# Patient Record
Sex: FEMALE | Race: WHITE | NOT HISPANIC OR LATINO | Employment: FULL TIME | URBAN - METROPOLITAN AREA
[De-identification: names, ages, dates, MRNs, and addresses within clinical notes are randomized per-mention and may not be internally consistent; named-entity substitution may affect disease eponyms.]

---

## 2017-04-03 DIAGNOSIS — E78.00 HIGH CHOLESTEROL: ICD-10-CM

## 2017-04-03 RX ORDER — ATORVASTATIN CALCIUM 10 MG/1
10 TABLET, FILM COATED ORAL DAILY
Qty: 90 TABLET | Refills: 3 | OUTPATIENT
Start: 2017-04-03

## 2017-04-03 NOTE — TELEPHONE ENCOUNTER
RN unable to refill med as last lipids were 1.5 years ago.  Routing to provider to advise.  Maria L Reyes RN

## 2017-04-07 DIAGNOSIS — E78.00 HIGH CHOLESTEROL: ICD-10-CM

## 2017-04-07 RX ORDER — ATORVASTATIN CALCIUM 10 MG/1
10 TABLET, FILM COATED ORAL DAILY
Qty: 30 TABLET | Refills: 0 | Status: SHIPPED | OUTPATIENT
Start: 2017-04-07 | End: 2017-05-02

## 2017-04-07 NOTE — TELEPHONE ENCOUNTER
Pt due for fasting lab appointment and ov for cholesterol and chronic rhinitis for further refills.  Teri Murphy RN

## 2017-04-14 ENCOUNTER — DOCUMENTATION ONLY (OUTPATIENT)
Dept: LAB | Facility: CLINIC | Age: 57
End: 2017-04-14

## 2017-04-14 DIAGNOSIS — Z11.59 NEED FOR HEPATITIS C SCREENING TEST: ICD-10-CM

## 2017-04-14 DIAGNOSIS — Z13.6 CARDIOVASCULAR SCREENING; LDL GOAL LESS THAN 160: Primary | ICD-10-CM

## 2017-04-18 ENCOUNTER — TELEPHONE (OUTPATIENT)
Dept: FAMILY MEDICINE | Facility: CLINIC | Age: 57
End: 2017-04-18

## 2017-04-18 DIAGNOSIS — Z12.11 SPECIAL SCREENING FOR MALIGNANT NEOPLASMS, COLON: Primary | ICD-10-CM

## 2017-04-18 NOTE — TELEPHONE ENCOUNTER
Panel Management Review      Patient has the following on her problem list:       IVD   ASA: Passed    Last LDL:    Lab Results   Component Value Date    CHOL 169 12/07/2015     Lab Results   Component Value Date    HDL 48 12/07/2015     Lab Results   Component Value Date    LDL 86 12/07/2015     Lab Results   Component Value Date    TRIG 173 12/07/2015        Lab Results   Component Value Date    CHOLHDLRATIO 4.7 06/14/2014        Is the patient on a Statin? YES   Is the patient on Aspirin? YES                  Medications     HMG CoA Reductase Inhibitors    atorvastatin (LIPITOR) 10 MG tablet    Salicylates    aspirin 81 MG tablet          Last three blood pressure readings:  BP Readings from Last 3 Encounters:   09/06/16 126/88   08/04/16 136/86   12/15/15 130/72        Tobacco History:     History   Smoking Status     Former Smoker     Packs/day: 1.00     Years: 35.00     Types: Cigarettes     Quit date: 6/23/2014   Smokeless Tobacco     Never Used         Composite cancer screening  Chart review shows that this patient is due/due soon for the following Colonoscopy and Fecal Colorectal (FIT)  Summary:    Patient is due/failing the following:   FIT    Action needed:   Fit test needs to be mailed to patient     Type of outreach:    None, routed to provider for review.    Questions for provider review:    Please sign FIT order and close encounter                                                                                                                                     Hesham Santiago CMA       Chart routed to Provider .

## 2017-04-19 DIAGNOSIS — Z11.59 NEED FOR HEPATITIS C SCREENING TEST: ICD-10-CM

## 2017-04-19 DIAGNOSIS — Z13.6 CARDIOVASCULAR SCREENING; LDL GOAL LESS THAN 160: ICD-10-CM

## 2017-04-19 LAB
CHOLEST SERPL-MCNC: 174 MG/DL
HCV AB SERPL QL IA: NORMAL
HDLC SERPL-MCNC: 53 MG/DL
LDLC SERPL CALC-MCNC: 79 MG/DL
NONHDLC SERPL-MCNC: 121 MG/DL
TRIGL SERPL-MCNC: 211 MG/DL

## 2017-04-19 PROCEDURE — 80061 LIPID PANEL: CPT | Performed by: FAMILY MEDICINE

## 2017-04-19 PROCEDURE — 86803 HEPATITIS C AB TEST: CPT | Performed by: FAMILY MEDICINE

## 2017-04-19 PROCEDURE — 36415 COLL VENOUS BLD VENIPUNCTURE: CPT | Performed by: FAMILY MEDICINE

## 2017-05-01 NOTE — PROGRESS NOTES
SUBJECTIVE:                                                    Kirstin Simmons is a 56 year old female who presents to clinic today for the following health issues:       History of Present Illness   Hyperlipidemia:     Low fat/chol diet rating::  Not monitoring fat    Taking Statins::  YES    Lipid Medications or Supplements::  None  Frequency of exercise::  None  Taking medications regularly::  Yes  Additional concerns today::  No    SUBJECTIVE:  56 year old enters for recheck of high cholesterol.  Pt. Has been taking med and has no side effects. Pt is following diet.  Denies chest pain and SOB.  Past Medical History:   Diagnosis Date     Abdominal or pelvic swelling, mass or lump, unspecified site      Abnormal glandular Papanicolaou smear of cervix      Abnormal Pap smear 09/06/2006     Backache, unspecified      Insomnia, unspecified      Other specified trigeminal nerve disorders      Thoracic or lumbosacral neuritis or radiculitis, unspecified      Unspecified disorder of menstruation and other abnormal bleeding from female genital tract      Unspecified symptom associated with female genital organs      Viral warts, unspecified      Past Surgical History:   Procedure Laterality Date     ABDOMEN SURGERY      Gall bladder removed. Fallopian tube removed     C OOPHORECTORMY FOR MALIG, W/BX  09/29/2006    left ovary and fallopian tube, left salpingo-oophorectomy     CHOLECYSTECTOMY, LAPOROSCOPIC      Cholecystectomy, Laparoscopic       Current Outpatient Prescriptions:      atorvastatin (LIPITOR) 10 MG tablet, Take 1 tablet (10 mg) by mouth daily, Disp: 90 tablet, Rfl: 3     aspirin 81 MG tablet, Take 1 tablet (81 mg) by mouth daily, Disp: 90 tablet, Rfl: 3     cetirizine (ZYRTEC) 10 MG tablet, Take 1 tablet by mouth daily., Disp: 90 tablet, Rfl: 3     [DISCONTINUED] atorvastatin (LIPITOR) 10 MG tablet, Take 1 tablet (10 mg) by mouth daily, Disp: 30 tablet, Rfl: 0  Reviewed health maintenance   Patient Active  Problem List   Diagnosis     Abnormal Pap smear     CARDIOVASCULAR SCREENING; LDL GOAL LESS THAN 160     Menopause     S/P oophorectomy     Lumbar radiculopathy     Chronic rhinitis     Skin tag     Benign neoplasm of skin of trunk, except scrotum     Backache     Dry eye syndrome     High cholesterol     Skin lesion     Advanced directives, counseling/discussion       OBJECTIVE:  no apparent distress  /76  Pulse 82  Temp 98.3  F (36.8  C) (Oral)  Wt 205 lb 6.4 oz (93.2 kg)  SpO2 100%  BMI 35.26 kg/m2      Exam:  Constitutional: healthy, alert and no distress  Head: Normocephalic. No masses, lesions, tenderness or abnormalities  Neck: Neck supple. No adenopathy. Thyroid symmetric, normal size,  Cardiovascular: negative, PMI normal. No lifts, heaves, or thrills. RRR. No murmurs, clicks gallops or rub  Respiratory: negative Lungs clear    Orders Only on 04/19/2017   Component Date Value Ref Range Status     Hepatitis C Antibody 04/19/2017   NR Final                    Value:Nonreactive   Assay performance characteristics have not been established for newborns,   infants, and children       Cholesterol 04/19/2017 174  <200 mg/dL Final     Triglycerides 04/19/2017 211* <150 mg/dL Final    Comment: Borderline high:  150-199 mg/dl   High:             200-499 mg/dl   Very high:       >499 mg/dl   Fasting specimen       HDL Cholesterol 04/19/2017 53  >49 mg/dL Final     LDL Cholesterol Calculated 04/19/2017 79  <100 mg/dL Final    Desirable:       <100 mg/dl     Non HDL Cholesterol 04/19/2017 121  <130 mg/dL Final           ICD-10-CM    1. High cholesterol E78.00 atorvastatin (LIPITOR) 10 MG tablet    PLAN: Follow up in 1 year

## 2017-05-02 ENCOUNTER — OFFICE VISIT (OUTPATIENT)
Dept: FAMILY MEDICINE | Facility: CLINIC | Age: 57
End: 2017-05-02
Payer: COMMERCIAL

## 2017-05-02 VITALS
WEIGHT: 205.4 LBS | BODY MASS INDEX: 35.26 KG/M2 | OXYGEN SATURATION: 100 % | TEMPERATURE: 98.3 F | HEART RATE: 82 BPM | DIASTOLIC BLOOD PRESSURE: 76 MMHG | SYSTOLIC BLOOD PRESSURE: 114 MMHG

## 2017-05-02 DIAGNOSIS — E78.00 HIGH CHOLESTEROL: ICD-10-CM

## 2017-05-02 PROCEDURE — 99213 OFFICE O/P EST LOW 20 MIN: CPT | Performed by: FAMILY MEDICINE

## 2017-05-02 RX ORDER — ATORVASTATIN CALCIUM 10 MG/1
10 TABLET, FILM COATED ORAL DAILY
Qty: 90 TABLET | Refills: 3 | Status: SHIPPED | OUTPATIENT
Start: 2017-05-02 | End: 2018-09-27

## 2017-05-02 NOTE — NURSING NOTE
"Chief Complaint   Patient presents with     Lipids       Initial /76  Pulse 82  Temp 98.3  F (36.8  C) (Oral)  Wt 205 lb 6.4 oz (93.2 kg)  SpO2 100%  BMI 35.26 kg/m2 Estimated body mass index is 35.26 kg/(m^2) as calculated from the following:    Height as of 8/4/16: 5' 4\" (1.626 m).    Weight as of this encounter: 205 lb 6.4 oz (93.2 kg).  Medication Reconciliation: complete  Hesham Santiago CMA    "

## 2017-05-02 NOTE — MR AVS SNAPSHOT
After Visit Summary   5/2/2017    Kirstin Simmons    MRN: [id number]           Patient Information     Date Of Birth          1960        Visit Information        Provider Department      5/2/2017 8:00 AM Rigo Cerda MD [facility]        Today's Diagnoses     High cholesterol           Follow-ups after your visit        Who to contact     If you have questions or need follow up information about today's clinic visit or your schedule please contact [facility] directly at [phone].  Normal or non-critical lab and imaging results will be communicated to you by MyChart, letter or phone within 4 business days after the clinic has received the results. If you do not hear from us within 7 days, please contact the clinic through [employer] or phone. If you have a critical or abnormal lab result, we will notify you by phone as soon as possible.  Submit refill requests through [employer] or call your pharmacy and they will forward the refill request to us. Please allow 3 business days for your refill to be completed.          Additional Information About Your Visit        MyChart Information     [employer] gives you secure access to your electronic health record. If you see a primary care provider, you can also send messages to your care team and make appointments. If you have questions, please call your primary care clinic.  If you do not have a primary care provider, please call [phone] and they will assist you.        Care EveryWhere ID     This is your Care EveryWhere ID. This could be used by other organizations to access your [place] medical records  [id number]        Your Vitals Were     Pulse Temperature Pulse Oximetry BMI (Body Mass Index)          82 98.3  F (36.8  C) (Oral) 100% 35.26 kg/m2         Blood Pressure from Last 3 Encounters:   05/02/17 114/76   09/06/16 126/88   08/04/16 136/86    Weight from Last 3 Encounters:   05/02/17 205 lb 6.4 oz (93.2 kg)    09/14/16 204 lb (92.5 kg)   09/06/16 204 lb 3.2 oz (92.6 kg)              Today, you had the following     No orders found for display         Today's Medication Changes          These changes are accurate as of: 5/2/17  8:38 AM.  If you have any questions, ask your nurse or doctor.               Stop taking these medicines if you haven't already. Please contact your care team if you have questions.     fluticasone 50 MCG/ACT spray   Commonly known as:  FLONASE   Stopped by:  Rigo Cerda MD                Where to get your medicines      These medications were sent to [facility] [address] [facility]  [address] [facility] [address]     Phone:  [phone]     atorvastatin 10 MG tablet                Primary Care Provider Office Phone # Fax #    Rigo Cerda [phone]       [facility] [address] [address] [address]        Thank you!     Thank you for choosing [facility] [place]  for your care. Our goal is always to provide you with excellent care. Hearing back from our patients is one way we can continue to improve our services. Please take a few minutes to complete the written survey that you may receive in the mail after your visit with us. Thank you!             Your Updated Medication List - Protect others around you: Learn how to safely use, store and throw away your medicines at www.disposemymeds.org.          This list is accurate as of: 5/2/17  8:38 AM.  Always use your most recent med list.                   Brand Name Dispense Instructions for use    aspirin 81 MG tablet     90 tablet    Take 1 tablet (81 mg) by mouth daily       atorvastatin 10 MG tablet    LIPITOR    90 tablet    Take 1 tablet (10 mg) by mouth daily       cetirizine 10 MG tablet    zyrTEC    90 tablet    Take 1 tablet by mouth daily.

## 2017-05-03 ENCOUNTER — MYC MEDICAL ADVICE (OUTPATIENT)
Dept: FAMILY MEDICINE | Facility: CLINIC | Age: 57
End: 2017-05-03

## 2017-05-03 NOTE — TELEPHONE ENCOUNTER
:;   Per Dr. Rigo Creda:  Schedule this person tomorrow (Thursday) at 2pm on his schedule.  I sent [employer] message back to grandma with instructions to call and register baby and leave message for team to schedule.  (see message below) Mellisa Cho RN

## 2017-05-11 ENCOUNTER — MYC MEDICAL ADVICE (OUTPATIENT)
Dept: FAMILY MEDICINE | Facility: CLINIC | Age: 57
End: 2017-05-11

## 2017-05-11 NOTE — TELEPHONE ENCOUNTER
Patient to have insurance cover should probably be seen by me or sports medicine  before MRI scan is done

## 2017-06-22 ENCOUNTER — OFFICE VISIT (OUTPATIENT)
Dept: FAMILY MEDICINE | Facility: CLINIC | Age: 57
End: 2017-06-22
Payer: COMMERCIAL

## 2017-06-22 VITALS
TEMPERATURE: 98.2 F | WEIGHT: 205 LBS | RESPIRATION RATE: 15 BRPM | BODY MASS INDEX: 35.19 KG/M2 | DIASTOLIC BLOOD PRESSURE: 75 MMHG | OXYGEN SATURATION: 98 % | HEART RATE: 89 BPM | SYSTOLIC BLOOD PRESSURE: 133 MMHG

## 2017-06-22 DIAGNOSIS — J31.0 CHRONIC RHINITIS: ICD-10-CM

## 2017-06-22 DIAGNOSIS — H57.89 EYE IRRITATION: Primary | ICD-10-CM

## 2017-06-22 PROCEDURE — 99213 OFFICE O/P EST LOW 20 MIN: CPT | Performed by: FAMILY MEDICINE

## 2017-06-22 RX ORDER — DIPHENHYDRAMINE HCL 25 MG
1 CAPSULE ORAL 3 TIMES DAILY PRN
Qty: 1 BOTTLE | Refills: 0 | Status: SHIPPED | OUTPATIENT
Start: 2017-06-22 | End: 2018-06-05

## 2017-06-22 NOTE — NURSING NOTE
"Chief Complaint   Patient presents with     Eye Problem     c/o pain in left eye       Initial /75  Pulse 89  Temp 98.2  F (36.8  C) (Tympanic)  Resp 15  Wt 205 lb (93 kg)  SpO2 98%  Breastfeeding? No  BMI 35.19 kg/m2 Estimated body mass index is 35.19 kg/(m^2) as calculated from the following:    Height as of 8/4/16: 5' 4\" (1.626 m).    Weight as of this encounter: 205 lb (93 kg).  Medication Reconciliation: complete   Gemma Ebuanks MA      "

## 2017-06-22 NOTE — MR AVS SNAPSHOT
After Visit Summary   6/22/2017    Kirstin Simmons    MRN: [id number]           Patient Information     Date Of Birth          1960        Visit Information        Provider Department      6/22/2017 2:40 PM Jenny Bond MD [facility]        Today's Diagnoses     Eye irritation    -  1    Chronic rhinitis           Follow-ups after your visit        Additional Services     ALLERGY/ASTHMA ADULT REFERRAL       Your provider has referred you to: G: [facility]  [phone] [email or web address]    Please be aware that coverage of these services is subject to the terms and limitations of your health insurance plan.  Call member services at your health plan with any benefit or coverage questions.      Please bring the following with you to your appointment:    (1) Any X-Rays, CTs or MRIs which have been performed.  Contact the facility where they were done to arrange for  prior to your scheduled appointment.    (2) List of current medications  (3) This referral request   (4) Any documents/labs given to you for this referral                  Who to contact     If you have questions or need follow up information about today's clinic visit or your schedule please contact [facility] directly at [phone].  Normal or non-critical lab and imaging results will be communicated to you by MyChart, letter or phone within 4 business days after the clinic has received the results. If you do not hear from us within 7 days, please contact the clinic through MyChart or phone. If you have a critical or abnormal lab result, we will notify you by phone as soon as possible.  Submit refill requests through [employer] or call your pharmacy and they will forward the refill request to us. Please allow 3 business days for your refill to be completed.          Additional Information About Your Visit        MyChart Information     MyChart  gives you secure access to your electronic health record. If you see a primary care provider, you can also send messages to your care team and make appointments. If you have questions, please call your primary care clinic.  If you do not have a primary care provider, please call [phone] and they will assist you.        Care EveryWhere ID     This is your Care EveryWhere ID. This could be used by other organizations to access your [facility] medical records  [id number]        Your Vitals Were     Pulse Temperature Respirations Pulse Oximetry Breastfeeding? BMI (Body Mass Index)    89 98.2  F (36.8  C) (Tympanic) 15 98% No 35.19 kg/m2       Blood Pressure from Last 3 Encounters:   06/22/17 133/75   05/02/17 114/76   09/06/16 126/88    Weight from Last 3 Encounters:   06/22/17 205 lb (93 kg)   05/02/17 205 lb 6.4 oz (93.2 kg)   09/14/16 204 lb (92.5 kg)              We Performed the Following     ALLERGY/ASTHMA ADULT REFERRAL          Today's Medication Changes          These changes are accurate as of: 6/22/17 11:06 PM.  If you have any questions, ask your nurse or doctor.               Start taking these medicines.        Dose/Directions    hypromellose-dextran 0.3-0.1% opthalmic solution   Used for:  Eye irritation   Started by:  Jenny Bond MD        Dose:  1 drop   Place 1 drop into both eyes 3 times daily as needed   Quantity:  1 Bottle   Refills:  0            Where to get your medicines      These medications were sent to [facility] - [place], [place] - [address] [facility]  [address] [facility] [address]     Phone:  [phone]     hypromellose-dextran 0.3-0.1% opthalmic solution                Primary Care Provider Office Phone # Fax #    Rigo Cerda [phone]       [facility] [address] [facility] [address]        Equal Access to Services     BLAKE CASAS AH: Jevon Castillo, sukumar rodríguez, qatylor de souza  manju delucasteven jasminisidoro arrietaaan ah. Monse [facility] [phone].    ATENCIÓN: Si ivyla dawson, tiene a zuleta disposición servicios gratuitos de asistencia lingüística. Godwin al [phone].    We comply with applicable federal civil rights laws and [place] laws. We do not discriminate on the basis of race, color, national origin, age, disability sex, sexual orientation or gender identity.            Thank you!     [facility] you for [facility]. Our goal is always to provide you with excellent care. Hearing back from our patients is one way we can continue to improve our services. Please take a few minutes to complete the written survey that you may receive in the mail after your visit with us. Thank you!             Your Updated Medication List - Protect others around you: Learn how to safely use, store and throw away your medicines at www.disposemymeds.org.          This list is accurate as of: 6/22/17 11:06 PM.  Always use your most recent med list.                   Brand Name Dispense Instructions for use Diagnosis    aspirin 81 MG tablet     90 tablet    Take 1 tablet (81 mg) by mouth daily        atorvastatin 10 MG tablet    LIPITOR    90 tablet    Take 1 tablet (10 mg) by mouth daily    High cholesterol       cetirizine 10 MG tablet    zyrTEC    90 tablet    Take 1 tablet by mouth daily.    Acute maxillary sinusitis       hypromellose-dextran 0.3-0.1% opthalmic solution     1 Bottle    Place 1 drop into both eyes 3 times daily as needed    Eye irritation

## 2017-06-22 NOTE — PROGRESS NOTES
SUBJECTIVE:                                                    Kirstin Simmons is a 56 year old female who presents to clinic today for the following health issues:      ALLERGIES      Duration: 1 day    Description:   Nasal congestion: YES  Sneezing: YES  Red, itchy eyes: YES    Accompanying signs and symptoms: roof of mouth sore    History (similar episodes/allergy testing): None    Precipitating or alleviating factors: None    Therapies tried and outcome: Zyrtec     Last year had the same symptoms ended up with a case of severe case of pink eye.  Was going back and forth in clinic to help it get better.  She took some patanol and made it worse.   Finally got a prescription and eventually got better    When this came on this morning thought it might be similar to pink eye.    Cough occasional  Fever No  Progressively getting worse: no  Thought was getting better then started getting worse: YES  Getting better:  No  Exposure to pertussis or pertussis like symptoms: No    Problem list and histories reviewed & adjusted, as indicated.  Additional history: as documented    Problem list, Medication list, Allergies, and Medical/Social/Surgical histories reviewed in EPIC and updated as appropriate.    ROS:  Constitutional, HEENT, cardiovascular, pulmonary, gi and gu systems are negative, except as otherwise noted.    OBJECTIVE:                                                    /75  Pulse 89  Temp 98.2  F (36.8  C) (Tympanic)  Resp 15  Wt 205 lb (93 kg)  SpO2 98%  Breastfeeding? No  BMI 35.19 kg/m2  Body mass index is 35.19 kg/(m^2).  GENERAL: healthy, alert and no distress  Eyes:  No grossly visible conjunctival or corneal foreign body No hyphema, no hypopyon, no ciliary flush, no periorbital swelling or cellulitis or pain with extraocular muscle movement. Very Mild erythema hardly noticeable bilateral conjunctiva  HENT: ear canals and TM's normal, nose and mouth without ulcers or lesions  Sinuses: turbinates  slightly pale  NECK: no adenopathy, no asymmetry, masses, or scars and thyroid normal to palpation  RESP: lungs clear to auscultation - no rales, rhonchi or wheezes   CV: regular rate and rhythm, normal S1 S2, no S3 or S4, no murmur, click or rub, no peripheral edema and peripheral pulses strong  MS: no gross musculoskeletal defects noted, no edema  SKIN: no suspicious lesions or rashes  NEURO: Normal strength and tone, mentation intact and speech normal  PSYCH: mentation appears normal, affect normal/bright    Diagnostic Test Results:  No results found for this or any previous visit (from the past 24 hour(s)).     ASSESSMENT/PLAN:                                                        ICD-10-CM    1. Eye irritation H57.8 hypromellose-dextran 0.3-0.1% (ARTIFICIAL TEARS) opthalmic solution   2. Chronic rhinitis J31.0 ALLERGY/ASTHMA ADULT REFERRAL     Eye irritation doesn't seem infectious at this time and appears mild.  Could be from allergies. Zyrtec seems helping.  Patient had adverse side effect with patanol.  Trial of artificial tears to see if some dry eyes may be contributing  Patient also has history of severe seasonal allergies and would like an allergy referral, referred to allergy  Recommend follow up with primary care provider or eye department if no relief in 3 days, sooner if worse  Adverse reactions of medications discussed.  Over the counter medications discussed.   Aware to come back in if with worsening symptoms or if no relief despite treatment plan  Patient voiced understanding and had no further questions.     MD Jenny Xavier MD  [facility]

## 2017-07-20 ENCOUNTER — OFFICE VISIT (OUTPATIENT)
Dept: FAMILY MEDICINE | Facility: CLINIC | Age: 57
End: 2017-07-20
Payer: COMMERCIAL

## 2017-07-20 VITALS
BODY MASS INDEX: 35.36 KG/M2 | DIASTOLIC BLOOD PRESSURE: 85 MMHG | TEMPERATURE: 98 F | WEIGHT: 206 LBS | OXYGEN SATURATION: 97 % | HEART RATE: 84 BPM | SYSTOLIC BLOOD PRESSURE: 135 MMHG

## 2017-07-20 DIAGNOSIS — Z12.4 SCREENING FOR MALIGNANT NEOPLASM OF CERVIX: Primary | ICD-10-CM

## 2017-07-20 DIAGNOSIS — N64.4 BREAST PAIN: ICD-10-CM

## 2017-07-20 PROCEDURE — 87624 HPV HI-RISK TYP POOLED RSLT: CPT | Performed by: FAMILY MEDICINE

## 2017-07-20 PROCEDURE — 99213 OFFICE O/P EST LOW 20 MIN: CPT | Performed by: FAMILY MEDICINE

## 2017-07-20 PROCEDURE — G0145 SCR C/V CYTO,THINLAYER,RESCR: HCPCS | Performed by: FAMILY MEDICINE

## 2017-07-20 NOTE — PROGRESS NOTES
SUBJECTIVE:  56 year old.The patient has a history of breast pain left side.  This started 4 days ago. Patient was reaching for something  She developed pain over a nipple. Associated symptoms are tingling the first day.  .  Better with time. ROS no galactorrhea  swelling or a mass      Reviewed health maintenance  Patient Active Problem List   Diagnosis     Abnormal Pap smear     CARDIOVASCULAR SCREENING; LDL GOAL LESS THAN 160     Menopause     S/P oophorectomy     Lumbar radiculopathy     Chronic rhinitis     Skin tag     Benign neoplasm of skin of trunk, except scrotum     Backache     Dry eye syndrome     High cholesterol     Skin lesion     Advanced directives, counseling/discussion     Past Medical History:   Diagnosis Date     Abdominal or pelvic swelling, mass or lump, unspecified site      Abnormal glandular Papanicolaou smear of cervix      Abnormal Pap smear 09/06/2006     Backache, unspecified      Insomnia, unspecified      Other specified trigeminal nerve disorders      Thoracic or lumbosacral neuritis or radiculitis, unspecified      Unspecified disorder of menstruation and other abnormal bleeding from female genital tract      Unspecified symptom associated with female genital organs      Viral warts, unspecified        OBJECTIVE:  no apparent distress  /85  Pulse 84  Temp 98  F (36.7  C) (Oral)  Wt 206 lb (93.4 kg)  SpO2 97%  BMI 35.36 kg/m2  Breast exam no masses  Nipple and alveoli no masses  LUNGS:  CTA B/L, no wheezing or crackles.   Cardiovascular: negative, PMI normal. No lifts, heaves, or thrills. RRR. No murmurs, clicks gallops or rub   Gastrointestinal: Abdomen soft, non-tender. BS normal. No masses, organomegaly   pap smear preformed    ICD-10-CM    1. Screening for malignant neoplasm of cervix Z12.4 Pap imaged thin layer screen with HPV - recommended age 30 - 65 years (select HPV order below)   2. Breast pain N64.4 BREAST CENTER REFERRAL    PLAN: await breast center

## 2017-07-20 NOTE — MR AVS SNAPSHOT
After Visit Summary   7/20/2017    Kirstin Simmons    MRN: [id number]           Patient Information     Date Of Birth          1960        Visit Information        Provider Department      7/20/2017 3:45 PM Rigo Cerda MD [facility]        Today's Diagnoses     Screening for malignant neoplasm of cervix    -  1    Breast pain           Follow-ups after your visit        Additional Services     BREAST CENTER REFERRAL       Your provider has referred you to: [facility]: [facility] [place] [phone]   [email or web address]    Please be aware that coverage of these services is subject to the terms and limitations of your health insurance plan.  Call member services at your health plan with any benefit or coverage questions.      Please bring the following with you to your appointment:    (1) Any X-Rays, CTs or MRIs which have been performed.  Contact the facility where they were done to arrange for  prior to your scheduled appointment.    (2) List of current medications   (3) This referral request   (4) Any documents/labs given to you for this referral                  Who to contact     If you have questions or need follow up information about today's clinic visit or your schedule please contact [facility] directly at [phone].  Normal or non-critical lab and imaging results will be communicated to you by MyChart, letter or phone within 4 business days after the clinic has received the results. If you do not hear from us within 7 days, please contact the clinic through MyChart or phone. If you have a critical or abnormal lab result, we will notify you by phone as soon as possible.  Submit refill requests through [employer] or call your pharmacy and they will forward the refill request to us. Please allow 3 business days for your refill to be completed.          Additional Information  About Your Visit        [email or web address] Information     [employer] gives you secure access to your electronic health record. If you see a primary care provider, you can also send messages to your care team and make appointments. If you have questions, please call your primary care clinic.  If you do not have a primary care provider, please call [phone] and they will assist you.        Care EveryWhere ID     This is your Care EveryWhere ID. This could be used by other organizations to access your [place] medical records  [id number]        Your Vitals Were     Pulse Temperature Pulse Oximetry BMI (Body Mass Index)          84 98  F (36.7  C) (Oral) 97% 35.36 kg/m2         Blood Pressure from Last 3 Encounters:   07/20/17 135/85   06/22/17 133/75   05/02/17 114/76    Weight from Last 3 Encounters:   07/20/17 206 lb (93.4 kg)   06/22/17 205 lb (93 kg)   05/02/17 205 lb 6.4 oz (93.2 kg)              We Performed the Following     BREAST CENTER REFERRAL     Pap imaged thin layer screen with HPV - recommended age 30 - 65 years (select HPV order below)        Primary Care Provider Office Phone # Fax #    Rigo Cerda [phone]       [facility] [address] [facility] [address]        Equal Access to Services     BLAKE CASAS : Hadii july ku hadasho Soomaali, waaxda luqadaha, qaybta kaalmada adeegyada, manju steinbergin haychristina pimentel. So [facility] [phone].    ATENCIÓN: Si habla español, tiene a zuleta disposición servicios gratuitos de asistencia lingüística. Llame al [phone].    We comply with applicable federal civil rights laws and [place] laws. We do not discriminate on the basis of race, color, national origin, age, disability sex, sexual orientation or gender identity.            Thank you!     Thank you for choosing [facility]  for your care. Our goal is always to provide you with excellent care. Hearing back from our patients is one way we can  continue to improve our services. Please take a few minutes to complete the written survey that you may receive in the mail after your visit with us. Thank you!             Your Updated Medication List - Protect others around you: Learn how to safely use, store and throw away your medicines at www.disposemymeds.org.          This list is accurate as of: 7/20/17  4:47 PM.  Always use your most recent med list.                   Brand Name Dispense Instructions for use Diagnosis    aspirin 81 MG tablet     90 tablet    Take 1 tablet (81 mg) by mouth daily        atorvastatin 10 MG tablet    LIPITOR    90 tablet    Take 1 tablet (10 mg) by mouth daily    High cholesterol       cetirizine 10 MG tablet    zyrTEC    90 tablet    Take 1 tablet by mouth daily.    Acute maxillary sinusitis       hypromellose-dextran 0.3-0.1% opthalmic solution     1 Bottle    Place 1 drop into both eyes 3 times daily as needed    Eye irritation

## 2017-07-20 NOTE — NURSING NOTE
"Chief Complaint   Patient presents with     Breast Problem     left, pain to touch, tender, started Sunday       Initial /85  Pulse 84  Temp 98  F (36.7  C) (Oral)  Wt 206 lb (93.4 kg)  SpO2 97%  BMI 35.36 kg/m2 Estimated body mass index is 35.36 kg/(m^2) as calculated from the following:    Height as of 8/4/16: 5' 4\" (1.626 m).    Weight as of this encounter: 206 lb (93.4 kg).  Medication Reconciliation: complete   Shani Aquino CMA      "

## 2017-07-25 LAB
COPATH REPORT: NORMAL
PAP: NORMAL

## 2017-07-27 LAB
FINAL DIAGNOSIS: NORMAL
HPV HR 12 DNA CVX QL NAA+PROBE: NEGATIVE
HPV16 DNA SPEC QL NAA+PROBE: NEGATIVE
HPV18 DNA SPEC QL NAA+PROBE: NEGATIVE
SPECIMEN DESCRIPTION: NORMAL

## 2017-08-01 ENCOUNTER — PRE VISIT (OUTPATIENT)
Dept: SURGERY | Facility: CLINIC | Age: 57
End: 2017-08-01

## 2017-08-01 NOTE — TELEPHONE ENCOUNTER
PREVISIT INFORMATION                                                    Kirstin Simmons is scheduled for future visit with Dr. North on 8/3/17 at 3:00pm at the [facility].    Reason for visit: Left breast pain  Referring provider: Rigo Cerda MD  Have images been done? Yes   Location: [facility] in [place]   Date: 4/26/16, no recent imaging  Previous pathology reports? No  Have previous office records been requested? Records in [facility]  Has the patient seen Dr. North in the past? (for old records): No    REVIEW                                                      New patient packet mailed to patient: No, to be given at check in  Medication reconciliation complete: No    PLAN/FOLLOW-UP NEEDED                                                      Previsit review complete.  Patient will see provider at future scheduled appointment.     Patient Reminders Given:    Informed patient to bring an updated list of allergies, medications, pharmacy details and insurance information. Directed patient to come to the 2nd floor, check-in #4 for their appointment. Informed patient to call back if appointment needs to be cancelled or rescheduled at [phone].    Reminded patient to bring any outside records regarding this appointment or have them faxed to clinic at [phone].      Maryjo Rincon LPN

## 2017-08-03 ENCOUNTER — OFFICE VISIT (OUTPATIENT)
Dept: SURGERY | Facility: CLINIC | Age: 57
End: 2017-08-03
Attending: FAMILY MEDICINE
Payer: COMMERCIAL

## 2017-08-03 ENCOUNTER — RADIANT APPOINTMENT (OUTPATIENT)
Dept: MAMMOGRAPHY | Facility: CLINIC | Age: 57
End: 2017-08-03
Attending: SURGERY
Payer: COMMERCIAL

## 2017-08-03 VITALS
SYSTOLIC BLOOD PRESSURE: 158 MMHG | HEIGHT: 64 IN | DIASTOLIC BLOOD PRESSURE: 98 MMHG | BODY MASS INDEX: 35.42 KG/M2 | HEART RATE: 76 BPM | WEIGHT: 207.5 LBS

## 2017-08-03 DIAGNOSIS — Z80.3 FAMILY HISTORY OF BREAST CANCER: ICD-10-CM

## 2017-08-03 DIAGNOSIS — Z80.3 FAMILY HISTORY OF BREAST CANCER: Primary | ICD-10-CM

## 2017-08-03 DIAGNOSIS — N64.4 BREAST PAIN, LEFT: ICD-10-CM

## 2017-08-03 PROCEDURE — 77063 BREAST TOMOSYNTHESIS BI: CPT | Performed by: STUDENT IN AN ORGANIZED HEALTH CARE EDUCATION/TRAINING PROGRAM

## 2017-08-03 PROCEDURE — 99243 OFF/OP CNSLTJ NEW/EST LOW 30: CPT | Performed by: SURGERY

## 2017-08-03 PROCEDURE — G0202 SCR MAMMO BI INCL CAD: HCPCS | Performed by: STUDENT IN AN ORGANIZED HEALTH CARE EDUCATION/TRAINING PROGRAM

## 2017-08-03 NOTE — PROGRESS NOTES
CHIEF COMPLAINT:  Chief Complaint   Patient presents with     Consult     left breast pain       HISTORY OF PRESENT ILLNESS:  Kirstin Simmons is a 56 year old female who is seen in consultation at the request of  Dr. Rigo Cerda for evaluation of left breast pain.  Kirstin reached for something recently and noticed suddenly that there was an exquisitely tender spot in her left breast.  This tenderness was in the area of the nipple and present only with palpation.  It gradually resolved and by the time she saw Dr. Cerda 4 days later, it was resolved.  She has noted no other breast changes and denies nipple discharge.    Kirstin has never had a breast biopsy or cyst aspiration.  She did have a large ovarian cyst for which she had a left oophorectomy and removal of the left fallopian tube.  There was no malignancy.    Kirstin reached menarche at age 13.  She is  having had her first pregnancy at ae 21.  She did not breastfeed.  She entered menopause in her late 40's after having her ovary and fallopian tube removed.  Kirstin takes no hormone replacement and complains of vaginal dryness and pain with intercourse.  FH is significant for breast cancer in her mother and maternal GM who were both diagnosed around age 60.  Both had recurrences and  of their disease in their 70's.  There is no other FH for cancer.    REVIEW OF SYSTEMS:  Constitutional:  Negative for chills, fatigue, fever and weight change.  Eyes:  Negative for blurred vision, eye pain and photophobia.  ENT:  Negative for hearing problems, ENT pain, congestion, rhinorrhea, epistaxis, hoarseness and dental problems.  Cardiovascular:  Negative for chest pain, palpitations, tachycardia, orthopnea and edema.  Respiratory:  Negative for cough, dyspnea and hemoptysis.  Gastrointestinal:  Negative for abdominal pain, heartburn, constipation, diarrhea and stool changes.  Musculoskeletal:  Negative for arthralgias, back pain and myalgias.  Integumentary/Breast:  See  HPI.    Past Medical History:   Diagnosis Date     Abdominal or pelvic swelling, mass or lump, unspecified site      Abnormal glandular Papanicolaou smear of cervix      Abnormal Pap smear 09/06/2006     Backache, unspecified      Insomnia, unspecified      Other specified trigeminal nerve disorders      Thoracic or lumbosacral neuritis or radiculitis, unspecified      Unspecified disorder of menstruation and other abnormal bleeding from female genital tract      Unspecified symptom associated with female genital organs      Viral warts, unspecified        Past Surgical History:   Procedure Laterality Date     ABDOMEN SURGERY      Gall bladder removed. Fallopian tube removed     C OOPHORECTORMY FOR JALEEL, W/BX  09/29/2006    left ovary and fallopian tube, left salpingo-oophorectomy     CHOLECYSTECTOMY, LAPOROSCOPIC      Cholecystectomy, Laparoscopic       Family History   Problem Relation Age of Onset     Breast Cancer Mother      Hypertension Mother      Breast Cancer Maternal Grandmother      HEART DISEASE Maternal Grandmother      Hypertension Maternal Grandmother        Social History   Substance Use Topics     Smoking status: Former Smoker     Packs/day: 1.00     Years: 35.00     Types: Cigarettes     Quit date: 6/23/2014     Smokeless tobacco: Never Used     Alcohol use 1.5 - 2.0 oz/week      Comment: 5 x a month       Patient Active Problem List   Diagnosis     Abnormal Pap smear     CARDIOVASCULAR SCREENING; LDL GOAL LESS THAN 160     Menopause     S/P oophorectomy     Lumbar radiculopathy     Chronic rhinitis     Skin tag     Benign neoplasm of skin of trunk, except scrotum     Backache     Dry eye syndrome     High cholesterol     Skin lesion     Advanced directives, counseling/discussion     Allergies   Allergen Reactions     Penicillins Hives     Ceclor [Cefaclor] Hives     Flonase [Fluticasone] Nausea     Latex      Morphine GI Disturbance     Olopatadine Other (See Comments)     Swelling      Perfume   "    Eggs GI Disturbance     Current Outpatient Prescriptions   Medication Sig Dispense Refill     hypromellose-dextran 0.3-0.1% (ARTIFICIAL TEARS) opthalmic solution Place 1 drop into both eyes 3 times daily as needed 1 Bottle 0     atorvastatin (LIPITOR) 10 MG tablet Take 1 tablet (10 mg) by mouth daily 90 tablet 3     aspirin 81 MG tablet Take 1 tablet (81 mg) by mouth daily 90 tablet 3     cetirizine (ZYRTEC) 10 MG tablet Take 1 tablet by mouth daily. 90 tablet 3     Vitals: BP (!) 158/98 (BP Location: Left arm)  Pulse 76  Ht 1.626 m (5' 4\")  Wt 94.1 kg (207 lb 8 oz)  BMI 35.62 kg/m2  BMI= Body mass index is 35.62 kg/(m^2).    EXAM:  GENERAL: healthy, alert and no distress   BREAST:  The breasts appear symmetric with no overlying skin changes.  The nipples are normal bilaterally.  There is no dimpling or thickening of the skin.  No mass is appreciated in either breast.  The breast tissue is generally soft with ridges of dense breast tissue in the upper outer breasts. The tissue around the left nipple is a little tender to palpation, but no abnormality was identified.  There is no axillary or supraclavicular lymphadenopathy.  CARDIOVASCULAR:  No edema or JVD.  RESPIRATORY: negative findings: no chest deformities noted, no chest wall tenderness, breathing is unlabored.  NECK: Neck supple. No adenopathy.   SKIN: No suspicious lesions or rashes  LYMPH: Normal cervical lymph nodes    ASSESSMENT:  Kirstin Simmons has no concerning findings on clinical breast exam at this time.  She is a little overdue for annual screening mammograms, so she had them done today and the report is pending.  I am not sure what caused the pain in her left breast, but since it resolved spontaneously, it does not seem to be of consequence.  Kirstin and I talked about her risk based on her FH.  I did not think that she needed to have any additional screening or testing, but did stress the importance of annual clinical breast exams and annual " mammograms.  I also talked about obesity and sedentary lifestyle as risk factors for breast cancer and encouraged exercise.  Kirstin told me that she has foot and knee problems and it was unlikely that she would start exercising.    PLAN:  Kirstin had annual screening mammograms with tomosynthesis today.  If the mammograms are read as being negative, she will continue with routine annual screening and will follow up with me if the pain returns or if she notices any other concerning breast changes.    Total time with patient visit: 45 minutes including discussions about the plan of care and care coordination with the patient.    Lilo North MD    Please route or send letter to:  Primary Care Provider (PCP)

## 2017-08-03 NOTE — LETTER
August 3, 2017       RE:  Kirstin Simmons-:  [birth year]    HISTORY OF PRESENT ILLNESS:  Kirstin Simmons is a 56 year old female who is seen in consultation at the request of  Dr. Rigo Cerda for evaluation of left breast pain.  Kirstin reached for something recently and noticed suddenly that there was an exquisitely tender spot in her left breast.  This tenderness was in the area of the nipple and present only with palpation.  It gradually resolved and by the time she saw Dr. Cerda 4 days later, it was resolved.  She has noted no other breast changes and denies nipple discharge.    Kirstin has never had a breast biopsy or cyst aspiration.  She did have a large ovarian cyst for which she had a left oophorectomy and removal of the left fallopian tube.  There was no malignancy.     Kirstin reached menarche at age 13.  She is  having had her first pregnancy at ae 21.  She did not breastfeed.  She entered menopause in her late 40's after having her ovary and fallopian tube removed.  Kirstin takes no hormone replacement and complains of vaginal dryness and pain with intercourse.  FH is significant for breast cancer in her mother and maternal GM who were both diagnosed around age 60.  Both had recurrences and  of their disease in their 70's.  There is no other FH for cancer.     REVIEW OF SYSTEMS:  Constitutional:  Negative for chills, fatigue, fever and weight change.  Eyes:  Negative for blurred vision, eye pain and photophobia.  ENT:  Negative for hearing problems, ENT pain, congestion, rhinorrhea, epistaxis, hoarseness and dental problems.  Cardiovascular:  Negative for chest pain, palpitations, tachycardia, orthopnea and edema.  Respiratory:  Negative for cough, dyspnea and hemoptysis.  Gastrointestinal:  Negative for abdominal pain, heartburn, constipation, diarrhea and stool changes.  Musculoskeletal:  Negative for arthralgias, back pain and myalgias.  Integumentary/Breast:  See HPI.     Vitals: BP (!) 158/98 (BP  "Location: Left arm)  Pulse 76  Ht 1.626 m (5' 4\")  Wt 94.1 kg (207 lb 8 oz)  BMI 35.62 kg/m2  BMI= Body mass index is 35.62 kg/(m^2).     EXAM:  GENERAL: healthy, alert and no distress   BREAST:  The breasts appear symmetric with no overlying skin changes.  The nipples are normal bilaterally.  There is no dimpling or thickening of the skin.  No mass is appreciated in either breast.  The breast tissue is generally soft with ridges of dense breast tissue in the upper outer breasts. The tissue around the left nipple is a little tender to palpation, but no abnormality was identified.  There is no axillary or supraclavicular lymphadenopathy.  CARDIOVASCULAR:  No edema or JVD.  RESPIRATORY: negative findings: no chest deformities noted, no chest wall tenderness, breathing is unlabored.  NECK: Neck supple. No adenopathy.   SKIN: No suspicious lesions or rashes  LYMPH: Normal cervical lymph nodes     ASSESSMENT:  Kirstin Simmons has no concerning findings on clinical breast exam at this time.  She is a little overdue for annual screening mammograms, so she had them done today and the report is pending.  I am not sure what caused the pain in her left breast, but since it resolved spontaneously, it does not seem to be of consequence.  Kirstin and I talked about her risk based on her FH.  I did not think that she needed to have any additional screening or testing, but did stress the importance of annual clinical breast exams and annual mammograms.  I also talked about obesity and sedentary lifestyle as risk factors for breast cancer and encouraged exercise.  Kirstin told me that she has foot and knee problems and it was unlikely that she would start exercising.     PLAN:  Kirstin had annual screening mammograms with tomosynthesis today.  If the mammograms are read as being negative, she will continue with routine annual screening and will follow up with me if the pain returns or if she notices any other concerning breast " changes.    Lilo North MD

## 2017-08-03 NOTE — NURSING NOTE
"Kirstin Simmons's goals for this visit include: left breast pain  She requests these members of her care team be copied on today's visit information: no    PCP: Rigo Cerda    Referring Provider:  Rigo Cerda MD  [facility]  [address] [place] [address]    Chief Complaint   Patient presents with     Consult     breast pain       Initial There were no vitals taken for this visit. Estimated body mass index is 35.36 kg/(m^2) as calculated from the following:    Height as of 8/4/16: 1.626 m (5' 4\").    Weight as of 7/20/17: 93.4 kg (206 lb).  Medication Reconciliation: complete    Do you need any medication refills at today's visit? No    Maryjo Rincon LPN      "

## 2017-08-22 ENCOUNTER — OFFICE VISIT (OUTPATIENT)
Dept: FAMILY MEDICINE | Facility: CLINIC | Age: 57
End: 2017-08-22
Payer: COMMERCIAL

## 2017-08-22 ENCOUNTER — RADIANT APPOINTMENT (OUTPATIENT)
Dept: GENERAL RADIOLOGY | Facility: CLINIC | Age: 57
End: 2017-08-22
Attending: FAMILY MEDICINE
Payer: COMMERCIAL

## 2017-08-22 VITALS
OXYGEN SATURATION: 100 % | DIASTOLIC BLOOD PRESSURE: 80 MMHG | WEIGHT: 206.4 LBS | SYSTOLIC BLOOD PRESSURE: 138 MMHG | HEART RATE: 78 BPM | BODY MASS INDEX: 35.43 KG/M2 | TEMPERATURE: 97.1 F

## 2017-08-22 DIAGNOSIS — S83.502A SPRAIN OF CRUCIATE LIGAMENT OF LEFT KNEE, INITIAL ENCOUNTER: ICD-10-CM

## 2017-08-22 DIAGNOSIS — Z12.11 SPECIAL SCREENING FOR MALIGNANT NEOPLASMS, COLON: ICD-10-CM

## 2017-08-22 DIAGNOSIS — N95.2 ATROPHIC VAGINITIS: Primary | ICD-10-CM

## 2017-08-22 PROCEDURE — 99214 OFFICE O/P EST MOD 30 MIN: CPT | Performed by: FAMILY MEDICINE

## 2017-08-22 PROCEDURE — 73560 X-RAY EXAM OF KNEE 1 OR 2: CPT | Mod: LT

## 2017-08-22 NOTE — MR AVS SNAPSHOT
After Visit Summary   8/22/2017    Kirstin Simmons    MRN: [id number]           Patient Information     Date Of Birth          1960        Visit Information        Provider Department      8/22/2017 8:30 AM Rigo Cerda MD [facility]        Today's Diagnoses     Atrophic vaginitis    -  1    Sprain of cruciate ligament of left knee, initial encounter        Special screening for malignant neoplasms, colon           Follow-ups after your visit        Future tests that were ordered for you today     Open Future Orders        Priority Expected Expires Ordered    Fecal colorectal cancer screen FIT - Future (S+30) Routine 9/12/2017 9/21/2017 8/22/2017            Who to contact     If you have questions or need follow up information about today's clinic visit or your schedule please contact [facility] directly at [phone].  Normal or non-critical lab and imaging results will be communicated to you by MyChart, letter or phone within 4 business days after the clinic has received the results. If you do not hear from us within 7 days, please contact the clinic through [employer] or phone. If you have a critical or abnormal lab result, we will notify you by phone as soon as possible.  Submit refill requests through [employer] or call your pharmacy and they will forward the refill request to us. Please allow 3 business days for your refill to be completed.          Additional Information About Your Visit        [employer] Information     [employer] gives you secure access to your electronic health record. If you see a primary care provider, you can also send messages to your care team and make appointments. If you have questions, please call your primary care clinic.  If you do not have a primary care provider, please call [phone] and they will assist you.        Care EveryWhere ID     This is your Care EveryWhere ID. This could be used by other organizations to access your  [place] medical records  [id number]        Your Vitals Were     Pulse Temperature Pulse Oximetry BMI (Body Mass Index)          78 97.1  F (36.2  C) (Oral) 100% 35.43 kg/m2         Blood Pressure from Last 3 Encounters:   08/22/17 138/80   08/03/17 (!) 158/98   07/20/17 135/85    Weight from Last 3 Encounters:   08/22/17 206 lb 6.4 oz (93.6 kg)   08/03/17 207 lb 8 oz (94.1 kg)   07/20/17 206 lb (93.4 kg)                 Today's Medication Changes          These changes are accurate as of: 8/22/17  9:28 AM.  If you have any questions, ask your nurse or doctor.               Start taking these medicines.        Dose/Directions    conjugated estrogens cream   Commonly known as:  PREMARIN   Used for:  Atrophic vaginitis   Started by:  Rigo Cerda MD        Dose:  0.5 g   Start taking on:  8/24/2017   Place 0.5 g vaginally twice a week   Quantity:  30 g   Refills:  12            Where to get your medicines      [facility] [place] [place] - [address]  [address] [facility] [address]     Phone:  [phone]     conjugated estrogens cream                Primary Care Provider Office Phone # Fax #    Rigo Cerda [phone] [address] [facility] [address]        Equal Access to Services     LAKEISHA CASAS AH: Hadii aad ku hadasho Soomaali, waaxda luqadaha, qaybta kaalmada adeegyada, manju humphreys . So [facility] [phone].    ATENCIÓN: Si habla español, tiene a zuleta disposición servicios gratuitos de asistencia lingüística. Llame al [phone].    We comply with applicable federal civil rights laws and [place] laws. We do not discriminate on the basis of race, color, national origin, age, disability sex, sexual orientation or gender identity.            Thank you!     Thank you for choosing [facility]  for your care. Our goal is always to provide you with excellent care.  Hearing back from our patients is one way we can continue to improve our services. Please take a few minutes to complete the written survey that you may receive in the mail after your visit with us. Thank you!             Your Updated Medication List - Protect others around you: Learn how to safely use, store and throw away your medicines at www.disposemymeds.org.          This list is accurate as of: 8/22/17  9:28 AM.  Always use your most recent med list.                   Brand Name Dispense Instructions for use Diagnosis    aspirin 81 MG tablet     90 tablet    Take 1 tablet (81 mg) by mouth daily        atorvastatin 10 MG tablet    LIPITOR    90 tablet    Take 1 tablet (10 mg) by mouth daily    High cholesterol       cetirizine 10 MG tablet    zyrTEC    90 tablet    Take 1 tablet by mouth daily.    Acute maxillary sinusitis       conjugated estrogens cream   Start taking on:  8/24/2017    PREMARIN    30 g    Place 0.5 g vaginally twice a week    Atrophic vaginitis       hypromellose-dextran 0.3-0.1% opthalmic solution     1 Bottle    Place 1 drop into both eyes 3 times daily as needed    Eye irritation

## 2017-08-22 NOTE — NURSING NOTE
"Chief Complaint   Patient presents with     Knee Pain     left knee pain, has crackled for years, 7/4 got up from chair and possibly twisted knee had instant pain, improved now.      Hormone therapy     discuss hormone thearapy, vaginal dryness and burning- recommended cream or insert by breast doctor        Initial /83  Pulse 78  Temp 97.1  F (36.2  C) (Oral)  Wt 206 lb 6.4 oz (93.6 kg)  SpO2 100%  BMI 35.43 kg/m2 Estimated body mass index is 35.43 kg/(m^2) as calculated from the following:    Height as of 8/3/17: 5' 4\" (1.626 m).    Weight as of this encounter: 206 lb 6.4 oz (93.6 kg).  Medication Reconciliation: complete  Hesham Santiago CMA    "

## 2017-08-22 NOTE — PROGRESS NOTES
SUBJECTIVE:  56 year old.The patient has a history of dryness of vaginal area.  This started coulple of years ago. Associated symptoms are dyspareunia .  Brought on by menopause .  Better with nothing. ROS  No vaginal bleeding,       Reviewed health maintenance  Patient Active Problem List   Diagnosis     Abnormal Pap smear     CARDIOVASCULAR SCREENING; LDL GOAL LESS THAN 160     Menopause     S/P oophorectomy     Lumbar radiculopathy     Chronic rhinitis     Skin tag     Benign neoplasm of skin of trunk, except scrotum     Backache     Dry eye syndrome     High cholesterol     Skin lesion     Advanced directives, counseling/discussion     Past Medical History:   Diagnosis Date     Abdominal or pelvic swelling, mass or lump, unspecified site      Abnormal glandular Papanicolaou smear of cervix      Abnormal Pap smear 09/06/2006     Arthritis      Backache, unspecified      Cancer (H)     Grandmother, mother     Congestive heart failure (H)     Grandmother     Insomnia, unspecified      Other specified trigeminal nerve disorders      Thoracic or lumbosacral neuritis or radiculitis, unspecified      Thyroid disease     Daughter     Unspecified disorder of menstruation and other abnormal bleeding from female genital tract      Unspecified symptom associated with female genital organs      Viral warts, unspecified        OBJECTIVE:  no apparent distress  /80  Pulse 78  Temp 97.1  F (36.2  C) (Oral)  Wt 206 lb 6.4 oz (93.6 kg)  SpO2 100%  BMI 35.43 kg/m2    LUNGS:  CTA B/L, no wheezing or crackles.   Cardiovascular: negative, PMI normal. No lifts, heaves, or thrills. RRR. No murmurs, clicks gallops or rub   Gastrointestinal: Abdomen soft, non-tender. BS normal. No masses, organomegaly       ICD-10-CM    1. Atrophic vaginitis N95.2     PLAN: trial of Premarin        SUBJECTIVE:  Kirstin Simmons is a 56 year old female who presents to the clinic today for left knee pain.  Onset of symptoms: 2 years ago.  History of  injury: none  Associated symptoms: crackling  Location of pain: medial anterior pain  Symptoms are: Unchanged  History of serious knee problems: no  Medications and therapies tried: rest   helped  What makes it worse: stairs    Past Medical, social, family histories, medications, and allergies reviewed and updated    OBJECTIVE:  Blood pressure 138/80, pulse 78, temperature 97.1  F (36.2  C), temperature source Oral, weight 206 lb 6.4 oz (93.6 kg), SpO2 100 %, not currently breastfeeding.  Patient appears to be in alert and in no apparent distress distress  KNEE EXAM (left)  Effusion: no  Erythema: no  Patellar tenderness: no  Medial joint line tenderness: yes  Lateral joint line tenderness: no  Lachman's test: negative  Kai's maneuver: negative  Valgus:negative  Varus:negative  range of motion: full  Calves soft non-tender.  Neurovascularly Intact Distally.      X-rays: joint space narrowing medially  normal: no effusions, fractures, spurring, joint space narrowing or loose bodies    ICD-10-CM    1. Atrophic vaginitis N95.2 conjugated estrogens (PREMARIN) cream   2. Sprain of cruciate ligament of left knee, initial encounter S83.502A XR Knee Left 1/2 Views   3. Special screening for malignant neoplasms, colon Z12.11 Fecal colorectal cancer screen FIT - Future (S+30)    PLAN:reassurance SLR excercises

## 2018-02-27 ENCOUNTER — OFFICE VISIT (OUTPATIENT)
Dept: FAMILY MEDICINE | Facility: CLINIC | Age: 58
End: 2018-02-27
Payer: COMMERCIAL

## 2018-02-27 VITALS
WEIGHT: 204 LBS | HEIGHT: 64 IN | HEART RATE: 85 BPM | TEMPERATURE: 97.2 F | SYSTOLIC BLOOD PRESSURE: 134 MMHG | RESPIRATION RATE: 16 BRPM | DIASTOLIC BLOOD PRESSURE: 80 MMHG | BODY MASS INDEX: 34.83 KG/M2 | OXYGEN SATURATION: 97 %

## 2018-02-27 DIAGNOSIS — J32.9 SINUSITIS, UNSPECIFIED CHRONICITY, UNSPECIFIED LOCATION: Primary | ICD-10-CM

## 2018-02-27 PROCEDURE — 99213 OFFICE O/P EST LOW 20 MIN: CPT | Performed by: PHYSICIAN ASSISTANT

## 2018-02-27 RX ORDER — AZITHROMYCIN 250 MG/1
TABLET, FILM COATED ORAL
Qty: 6 TABLET | Refills: 0 | Status: SHIPPED | OUTPATIENT
Start: 2018-02-27 | End: 2018-03-04

## 2018-02-27 NOTE — PROGRESS NOTES
SUBJECTIVE:                                                    Kirstin Simmons is a 57 year old female who presents to clinic today for the following health issues:    SINUS SYMPTOMS      Duration: 2 months off and on, sx;s this time started on Friday      Description  nasal congestion, cough, ear pain, headache, sore throat, facial pressure, mild runny nose     Severity: moderate    Accompanying signs and symptoms: None    History (predisposing factors):  History of sinus infections    Precipitating or alleviating factors: None    Therapies tried and outcome:  advil cold and sinus       Problem list and histories reviewed & adjusted, as indicated.  Additional history: as documented    Patient Active Problem List   Diagnosis     Abnormal Pap smear     CARDIOVASCULAR SCREENING; LDL GOAL LESS THAN 160     Menopause     S/P oophorectomy     Lumbar radiculopathy     Chronic rhinitis     Skin tag     Benign neoplasm of skin of trunk, except scrotum     Backache     Dry eye syndrome     High cholesterol     Skin lesion     Advanced directives, counseling/discussion     Past Surgical History:   Procedure Laterality Date     ABDOMEN SURGERY      Gall bladder removed. Fallopian tube removed     C OOPHORECTORMY FOR JALEEL, W/BX  09/29/2006    left ovary and fallopian tube, left salpingo-oophorectomy     CHOLECYSTECTOMY, LAPOROSCOPIC      Cholecystectomy, Laparoscopic       Social History   Substance Use Topics     Smoking status: Former Smoker     Packs/day: 1.00     Years: 35.00     Types: Cigarettes     Quit date: 6/23/2014     Smokeless tobacco: Never Used     Alcohol use 1.5 - 2.0 oz/week      Comment: 5 x a month     Family History   Problem Relation Age of Onset     Breast Cancer Mother      Hypertension Mother      Breast Cancer Maternal Grandmother      HEART DISEASE Maternal Grandmother      Hypertension Maternal Grandmother          Current Outpatient Prescriptions   Medication Sig Dispense Refill     azithromycin  "(ZITHROMAX) 250 MG tablet Two tablets first day, then one tablet daily for four days. 6 tablet 0     hypromellose-dextran 0.3-0.1% (ARTIFICIAL TEARS) opthalmic solution Place 1 drop into both eyes 3 times daily as needed 1 Bottle 0     atorvastatin (LIPITOR) 10 MG tablet Take 1 tablet (10 mg) by mouth daily 90 tablet 3     aspirin 81 MG tablet Take 1 tablet (81 mg) by mouth daily 90 tablet 3     cetirizine (ZYRTEC) 10 MG tablet Take 1 tablet by mouth daily. 90 tablet 3     Allergies   Allergen Reactions     Penicillins Hives     Ceclor [Cefaclor] Hives     Flonase [Fluticasone] Nausea     Latex      Morphine GI Disturbance     Olopatadine Other (See Comments)     Swelling      Perfume      Eggs GI Disturbance       ROS:  Constitutional, HEENT, cardiovascular, pulmonary, gi and gu systems are negative, except as otherwise noted.    OBJECTIVE:     /80  Pulse 85  Temp 97.2  F (36.2  C) (Oral)  Resp 16  Ht 5' 4\" (1.626 m)  Wt 204 lb (92.5 kg)  SpO2 97%  BMI 35.02 kg/m2  Body mass index is 35.02 kg/(m^2).  GENERAL: healthy, alert and no distress  Head: Normocephalic, atraumatic.  Eyes: Conjunctiva clear, non icteric. PERRLA.  Ears: External ears and TMs normal BL.  Nasal congestion with posterior nasal drainage.  maxillary tenderness.   Mouth / Throat: Normal dentition.  No oral lesions. Pharynx non erythematous, tonsils without hypertrophy.  Neck: Supple, no enlarged LN, trachea midline.   RESP: lungs clear to auscultation - no rales, rhonchi or wheezes  CV: regular rate and rhythm, normal S1 S2, no S3 or S4, no murmur, click or rub, no peripheral edema      Diagnostic Test Results:  none     ASSESSMENT/PLAN:         ICD-10-CM    1. Sinusitis, unspecified chronicity, unspecified location J32.9 azithromycin (ZITHROMAX) 250 MG tablet   Warning signs discussed.  side effects discussed  Symptomatic treatment: such as fluids,  OTC acetaminophen and /or non-steroidal anti-inflammatory medication.  Follow up  1-2 " wks as needed     Manish Quach PA-C  [facility]

## 2018-02-27 NOTE — MR AVS SNAPSHOT
"              After Visit Summary   2/27/2018    Kirstin Simmons    MRN: [id number]           Patient Information     Date Of Birth          1960        Visit Information        Provider Department      2/27/2018 10:20 AM Manish Quach PA-C [facility]        Today's Diagnoses     Sinusitis, unspecified chronicity, unspecified location    -  1       Follow-ups after your visit        Who to contact     If you have questions or need follow up information about today's clinic visit or your schedule please contact [facility] directly at [phone].  Normal or non-critical lab and imaging results will be communicated to you by POLYBONAhart, letter or phone within 4 business days after the clinic has received the results. If you do not hear from us within 7 days, please contact the clinic through POLYBONAhart or phone. If you have a critical or abnormal lab result, we will notify you by phone as soon as possible.  Submit refill requests through BigTeams or call your pharmacy and they will forward the refill request to us. Please allow 3 business days for your refill to be completed.          Additional Information About Your Visit        MyChart Information     BigTeams gives you secure access to your electronic health record. If you see a primary care provider, you can also send messages to your care team and make appointments. If you have questions, please call your primary care clinic.  If you do not have a primary care provider, please call [phone] and they will assist you.        Care EveryWhere ID     This is your Care EveryWhere ID. This could be used by other organizations to access your [place] medical records  [id number]        Your Vitals Were     Pulse Temperature Respirations Height Pulse Oximetry BMI (Body Mass Index)    85 97.2  F (36.2  C) (Oral) 16 5' 4\" (1.626 m) 97% 35.02 kg/m2       Blood Pressure from Last 3 Encounters:   02/27/18 134/80   08/22/17 138/80 "   08/03/17 (!) 158/98    Weight from Last 3 Encounters:   02/27/18 204 lb (92.5 kg)   08/22/17 206 lb 6.4 oz (93.6 kg)   08/03/17 207 lb 8 oz (94.1 kg)              Today, you had the following     No orders found for display         Today's Medication Changes          These changes are accurate as of 2/27/18 10:43 AM.  If you have any questions, ask your nurse or doctor.               Start taking these medicines.        Dose/Directions    azithromycin 250 MG tablet   Commonly known as:  ZITHROMAX   Used for:  Sinusitis, unspecified chronicity, unspecified location   Started by:  Manish Quach PA-C        Two tablets first day, then one tablet daily for four days.   Quantity:  6 tablet   Refills:  0            Where to get your medicines      These medications were sent to [facility] [address] [place], [place] - [address]  [address] [facility] [address]     Phone:  [phone]     azithromycin 250 MG tablet                Primary Care Provider Office Phone # Fax #    Rigo Sukh Cerda [phone] [address] [facility] [address]        Equal Access to Services     BLAKE CASAS AH: Hadii july ku hadasho Soomaali, waaxda luqadaha, qaybta kaalmada adeegyada, manju pimentel. So [facility] [phone].    ATENCIÓN: Si habla español, tiene a zuleta disposición servicios gratuitos de asistencia lingüística. [facility] [phone].    We comply with applicable federal civil rights laws and [place] laws. We do not discriminate on the basis of race, color, national origin, age, disability, sex, sexual orientation, or gender identity.            Thank you!     Thank you for choosing [facility]  for your care. Our goal is always to provide you with excellent care. Hearing back from our patients is one way we can continue to improve our services. Please take a few minutes to complete the written survey that you may receive in the mail  after your visit with us. Thank you!             Your Updated Medication List - Protect others around you: Learn how to safely use, store and throw away your medicines at www.disposemymeds.org.          This list is accurate as of 2/27/18 10:43 AM.  Always use your most recent med list.                   Brand Name Dispense Instructions for use Diagnosis    aspirin 81 MG tablet     90 tablet    Take 1 tablet (81 mg) by mouth daily        atorvastatin 10 MG tablet    LIPITOR    90 tablet    Take 1 tablet (10 mg) by mouth daily    High cholesterol       azithromycin 250 MG tablet    ZITHROMAX    6 tablet    Two tablets first day, then one tablet daily for four days.    Sinusitis, unspecified chronicity, unspecified location       cetirizine 10 MG tablet    zyrTEC    90 tablet    Take 1 tablet by mouth daily.    Acute maxillary sinusitis       hypromellose-dextran 0.3-0.1% opthalmic solution     1 Bottle    Place 1 drop into both eyes 3 times daily as needed    Eye irritation

## 2018-02-27 NOTE — NURSING NOTE
"Chief Complaint   Patient presents with     Sinus Problem       Initial /80  Pulse 85  Temp 97.2  F (36.2  C) (Oral)  Resp 16  Ht 5' 4\" (1.626 m)  Wt 204 lb (92.5 kg)  SpO2 97%  BMI 35.02 kg/m2 Estimated body mass index is 35.02 kg/(m^2) as calculated from the following:    Height as of this encounter: 5' 4\" (1.626 m).    Weight as of this encounter: 204 lb (92.5 kg).  Medication Reconciliation: complete  Vero Hurst CMA    "

## 2018-03-04 ENCOUNTER — OFFICE VISIT (OUTPATIENT)
Dept: URGENT CARE | Facility: URGENT CARE | Age: 58
End: 2018-03-04
Payer: COMMERCIAL

## 2018-03-04 VITALS
DIASTOLIC BLOOD PRESSURE: 86 MMHG | TEMPERATURE: 97.9 F | SYSTOLIC BLOOD PRESSURE: 152 MMHG | BODY MASS INDEX: 34.66 KG/M2 | HEIGHT: 64 IN | HEART RATE: 80 BPM | WEIGHT: 203 LBS | OXYGEN SATURATION: 96 %

## 2018-03-04 DIAGNOSIS — H72.91 OTITIS MEDIA, SEROUS, TM RUPTURE, RIGHT: Primary | ICD-10-CM

## 2018-03-04 DIAGNOSIS — H65.91 OTITIS MEDIA, SEROUS, TM RUPTURE, RIGHT: Primary | ICD-10-CM

## 2018-03-04 DIAGNOSIS — H91.93 DECREASED HEARING OF BOTH EARS: ICD-10-CM

## 2018-03-04 PROCEDURE — 99214 OFFICE O/P EST MOD 30 MIN: CPT | Performed by: NURSE PRACTITIONER

## 2018-03-04 RX ORDER — CEFDINIR 300 MG/1
300 CAPSULE ORAL 2 TIMES DAILY
Qty: 20 CAPSULE | Refills: 0 | Status: SHIPPED | OUTPATIENT
Start: 2018-03-04 | End: 2018-03-14

## 2018-03-04 NOTE — PROGRESS NOTES
SUBJECTIVE:                                                    Kirstin Simmons is a 57 year old female who presents to clinic today for the following health issues:    ENT Symptoms             Symptoms: cc Present Absent Comment   Fever/Chills   x    Fatigue  x     Muscle Aches   x    Eye Irritation  x     Sneezing  x     Nasal Quentin/Drg  x     Sinus Pressure/Pain  x     Loss of smell   x    Dental pain   x    Sore Throat  x     Swollen Glands   x    Ear Pain/Fullness  x     Cough  x     Wheeze   x    Chest Pain   x    Shortness of breath   x    Rash   x    Other   x      Symptom duration:  1.5 weeks ago   Symptom severity:  moderate   Treatments tried:  z pack did not help at all, zyrtec, advil cold and sinus   Contacts:  none       Problem list and histories reviewed & adjusted, as indicated.  Additional history: as documented    Patient Active Problem List   Diagnosis     Abnormal Pap smear     CARDIOVASCULAR SCREENING; LDL GOAL LESS THAN 160     Menopause     S/P oophorectomy     Lumbar radiculopathy     Chronic rhinitis     Skin tag     Benign neoplasm of skin of trunk, except scrotum     Backache     Dry eye syndrome     High cholesterol     Skin lesion     Advanced directives, counseling/discussion     Past Surgical History:   Procedure Laterality Date     ABDOMEN SURGERY      Gall bladder removed. Fallopian tube removed     C OOPHORECTORMY FOR [place], W/BX  09/29/2006    left ovary and fallopian tube, left salpingo-oophorectomy     CHOLECYSTECTOMY, LAPOROSCOPIC      Cholecystectomy, Laparoscopic       Social History   Substance Use Topics     Smoking status: Former Smoker     Packs/day: 1.00     Years: 35.00     Types: Cigarettes     Quit date: 6/23/2014     Smokeless tobacco: Never Used     Alcohol use 1.5 - 2.0 oz/week      Comment: 5 x a month     Family History   Problem Relation Age of Onset     Breast Cancer Mother      Hypertension Mother      Breast Cancer Maternal Grandmother      HEART DISEASE  "Maternal Grandmother      Hypertension Maternal Grandmother            ROS:  Constitutional, HEENT, cardiovascular, pulmonary, GI, , musculoskeletal, neuro, skin, endocrine and psych systems are negative, except as otherwise noted.    OBJECTIVE:     /86 (BP Location: Right arm, Cuff Size: Adult Large)  Pulse 80  Temp 97.9  F (36.6  C) (Oral)  Ht 5' 4\" (1.626 m)  Wt 203 lb (92.1 kg)  SpO2 96%  BMI 34.84 kg/m2  Body mass index is 34.84 kg/(m^2).  GENERAL: healthy, alert and no distress  EYES: Eyes grossly normal to inspection, PERRL and conjunctivae and sclerae normal  HENT: right ear: erythematous, perforation and purulent drainage in canal, left ear: erythematous and bulging membrane, nose and mouth without ulcers or lesions, oropharynx clear and oral mucous membranes moist  NECK: no adenopathy, no asymmetry, masses, or scars and thyroid normal to palpation  RESP: lungs clear to auscultation - no rales, rhonchi or wheezes  CV: regular rate and rhythm, normal S1 S2, no S3 or S4, no murmur, click or rub, no peripheral edema and peripheral pulses strong      ASSESSMENT/PLAN:       1. Otitis media, serous, tm rupture, right  Left ear also infected, doesn't appear to be perforated like right  Discussed if drainage can wipe out gently but do not insert qtip or anything into ear.     - Ok to use cefdinir (hives reaction to pcn ceclor)  cefdinir (OMNICEF) 300 MG capsule; Take 1 capsule (300 mg) by mouth 2 times daily for 10 days  Dispense: 20 capsule; Refill: 0    Advil for pain management, directions given  Home care advised patient education given.     2. Decreased hearing of both ears  Hearing is worse in right ear  - OTOLARYNGOLOGY REFERRAL (follow up this week)    D[facility] if [facility]  "

## 2018-03-04 NOTE — MR AVS SNAPSHOT
After Visit Summary   3/4/2018    Kirstin Simmons    MRN: [id number]           Patient Information     Date Of Birth          1960        Visit Information        Provider Department      3/4/2018 11:15 AM Yumiko Gong APRN [facility]        Today's Diagnoses     Otitis media, serous, tm rupture, right    -  1      Care Instructions      Ruptured Infected Eardrum (Adult)    The middle ear is the space behind the eardrum. You have an infection of the middle ear. This can lead to pressure that causes the eardrum to break (rupture). This may cause sudden pain. Pus or blood will drain out of the ear canal. Your hearing will also likely be affected.  The infection may be treated with antibiotics. The eardrum usually heals completely on its own. If it does not, further treatment is needed. For this reason, it s important to have a follow-up exam with an ear specialist.  Home care    Keep taking prescribed antibiotics until all of the medicine is gone. Do this even if you feel better after the first few days.    Take any other medicines as prescribed.    Keep a clean cotton ball in the ear canal to absorb drainage. Change the cotton often, when it becomes soiled with fluid drainage. Don t let water get into the ear. Don t put any medicine drops into the ear unless your healthcare provider tells you to do so.  Follow-up care  Follow up with your healthcare provider in 2 weeks, or as advised. This is to make sure the infection is getting better and the eardrum is healing. Also follow up with specialists as advised for a hearing test or exam.  When to seek medical advice  Call your healthcare provider if you have any of the following:    Fever of 100.4 F (38 C) or higher    Pain that gets worse or doesn t get better    Unusual drowsiness or confusion    Headache, neck pain or a stiff neck    Convulsions (seizure)    Worsening dizziness    Worsening hearing loss or ringing in the  ear  Date Last Reviewed: 5/3/2015    [id number] [employer]. [address] [place] [address], [place] [address]. All rights reserved. This information is not intended as a substitute for professional medical care. Always follow your healthcare professional's instructions.                Follow-ups after your visit        Additional Services     OTOLARYNGOLOGY REFERRAL       Your provider has referred you to: [place]OGER[place]: [facility] [phone]  [email or web address]    Please be aware that coverage of these services is subject to the terms and limitations of your health insurance plan.  Call member services at your health plan with any benefit or coverage questions.      Please bring the following with you to your appointment:    (1) Any X-Rays, CTs or MRIs which have been performed.  Contact the facility where they were done to arrange for  prior to your scheduled appointment.   (2) List of current medications  (3) This referral request   (4) Any documents/labs given to you for this referral                  Who to contact     If you have questions or need follow up information about today's clinic visit or your schedule please contact [facility] directly at [phone].  Normal or non-critical lab and imaging results will be communicated to you by MyChart, letter or phone within 4 business days after the clinic has received the results. If you do not hear from us within 7 days, please contact the clinic through MyChart or phone. If you have a critical or abnormal lab result, we will notify you by phone as soon as possible.  Submit refill requests through 8bit or call your pharmacy and they will forward the refill request to us. Please allow 3 business days for your refill to be completed.          Additional Information About Your Visit        [employer] Grouphart Information     8bit gives you secure access to your electronic health record. If you see a  "primary care provider, you can also send messages to your care team and make appointments. If you have questions, please call your primary care clinic.  If you do not have a primary care provider, please call [phone] and they will assist you.        Care EveryWhere ID     This is your Care EveryWhere ID. This could be used by other organizations to access your [place] medical records  [id number]        Your Vitals Were     Pulse Temperature Height Pulse Oximetry BMI (Body Mass Index)       80 97.9  F (36.6  C) (Oral) 5' 4\" (1.626 m) 96% 34.84 kg/m2        Blood Pressure from Last 3 Encounters:   03/04/18 152/86   02/27/18 134/80   08/22/17 138/80    Weight from Last 3 Encounters:   03/04/18 203 lb (92.1 kg)   02/27/18 204 lb (92.5 kg)   08/22/17 206 lb 6.4 oz (93.6 kg)              We Performed the Following     OTOLARYNGOLOGY REFERRAL        Primary Care Provider Office Phone # Fax #    Rigo Cerda [phone] [address] [facility] [address]        Equal Access to Services     LAKEISHA CASAS : Hadii aad ku hadasho Soomaali, waaxda luqadaha, qaybta kaalmada adeegyada, waxay andrea haysauravn mahesh cespedes lateagan pimentel. So [facility] [phone].    ATENCIÓN: Si habla español, tiene a zuleta disposición servicios gratuitos de asistencia lingüística. [facility] [phone].    We comply with applicable federal civil rights laws and [place] laws. We do not discriminate on the basis of race, color, national origin, age, disability, sex, sexual orientation, or gender identity.            Thank you!     Thank you for choosing [facility]  for your care. Our goal is always to provide you with excellent care. Hearing back from our patients is one way we can continue to improve our services. Please take a few minutes to complete the written survey that you may receive in the mail after your visit with us. Thank you!             Your Updated Medication List - Protect others around " you: Learn how to safely use, store and throw away your medicines at www.disposemymeds.org.          This list is accurate as of 3/4/18 11:58 AM.  Always use your most recent med list.                   Brand Name Dispense Instructions for use Diagnosis    aspirin 81 MG tablet     90 tablet    Take 1 tablet (81 mg) by mouth daily        atorvastatin 10 MG tablet    LIPITOR    90 tablet    Take 1 tablet (10 mg) by mouth daily    High cholesterol       cetirizine 10 MG tablet    zyrTEC    90 tablet    Take 1 tablet by mouth daily.    Acute maxillary sinusitis       hypromellose-dextran 0.3-0.1% opthalmic solution     1 Bottle    Place 1 drop into both eyes 3 times daily as needed    Eye irritation

## 2018-03-04 NOTE — PATIENT INSTRUCTIONS
Ruptured Infected Eardrum (Adult)    The middle ear is the space behind the eardrum. You have an infection of the middle ear. This can lead to pressure that causes the eardrum to break (rupture). This may cause sudden pain. Pus or blood will drain out of the ear canal. Your hearing will also likely be affected.  The infection may be treated with antibiotics. The eardrum usually heals completely on its own. If it does not, further treatment is needed. For this reason, it s important to have a follow-up exam with an ear specialist.  Home care    Keep taking prescribed antibiotics until all of the medicine is gone. Do this even if you feel better after the first few days.    Take any other medicines as prescribed.    Keep a clean cotton ball in the ear canal to absorb drainage. Change the cotton often, when it becomes soiled with fluid drainage. Don t let water get into the ear. Don t put any medicine drops into the ear unless your healthcare provider tells you to do so.  Follow-up care  Follow up with your healthcare provider in 2 weeks, or as advised. This is to make sure the infection is getting better and the eardrum is healing. Also follow up with specialists as advised for a hearing test or exam.  When to seek medical advice  Call your healthcare provider if you have any of the following:    Fever of 100.4 F (38 C) or higher    Pain that gets worse or doesn t get better    Unusual drowsiness or confusion    Headache, neck pain or a stiff neck    Convulsions (seizure)    Worsening dizziness    Worsening hearing loss or ringing in the ear  Date Last Reviewed: 5/3/2015    6139-3784 [employer]. [address] [place] [address], [place] [address]. All rights reserved. This information is not intended as a substitute for professional medical care. Always follow your healthcare professional's instructions.

## 2018-03-09 ENCOUNTER — OFFICE VISIT (OUTPATIENT)
Dept: OTOLARYNGOLOGY | Facility: CLINIC | Age: 58
End: 2018-03-09
Payer: COMMERCIAL

## 2018-03-09 VITALS — HEIGHT: 64 IN | WEIGHT: 204 LBS | TEMPERATURE: 98 F | BODY MASS INDEX: 34.83 KG/M2 | RESPIRATION RATE: 16 BRPM

## 2018-03-09 DIAGNOSIS — H65.92 OME (OTITIS MEDIA WITH EFFUSION), LEFT: ICD-10-CM

## 2018-03-09 DIAGNOSIS — H65.91 OME (OTITIS MEDIA WITH EFFUSION), RIGHT: Primary | ICD-10-CM

## 2018-03-09 PROCEDURE — 99243 OFF/OP CNSLTJ NEW/EST LOW 30: CPT | Performed by: OTOLARYNGOLOGY

## 2018-03-09 RX ORDER — OFLOXACIN 3 MG/ML
5 SOLUTION AURICULAR (OTIC) 2 TIMES DAILY
Qty: 10 ML | Refills: 0 | Status: SHIPPED | OUTPATIENT
Start: 2018-03-09 | End: 2018-03-16

## 2018-03-09 NOTE — PROGRESS NOTES
I am seeing this patient in consultation for ear infection at the request of the provider Yumiko Gong.     Chief Complaint - Hearing loss    History of Present Illness - Kirstin Simmons is a 57 year old female who presents to me today with hearing loss or a plugged feeling in both ears.  It has been present and noticeable for approximately 2 weeks. Had bilateral ear pain and otorrhea both sides. This was preceded by an upper respiratory infection. There is no history of chronic ear disease or ear surgery. The patient has no more ear pain, but some pressure. Hearing is down. The patient has tried two courses of antibiotics, but these things have not helped.     Past Medical History -   Patient Active Problem List   Diagnosis     Abnormal Pap smear     CARDIOVASCULAR SCREENING; LDL GOAL LESS THAN 160     Menopause     S/P oophorectomy     Lumbar radiculopathy     Chronic rhinitis     Skin tag     Benign neoplasm of skin of trunk, except scrotum     Backache     Dry eye syndrome     High cholesterol     Skin lesion     Advanced directives, counseling/discussion       Current Medications -   Current Outpatient Prescriptions:      cefdinir (OMNICEF) 300 MG capsule, Take 1 capsule (300 mg) by mouth 2 times daily for 10 days, Disp: 20 capsule, Rfl: 0     hypromellose-dextran 0.3-0.1% (ARTIFICIAL TEARS) opthalmic solution, Place 1 drop into both eyes 3 times daily as needed, Disp: 1 Bottle, Rfl: 0     atorvastatin (LIPITOR) 10 MG tablet, Take 1 tablet (10 mg) by mouth daily, Disp: 90 tablet, Rfl: 3     aspirin 81 MG tablet, Take 1 tablet (81 mg) by mouth daily, Disp: 90 tablet, Rfl: 3     cetirizine (ZYRTEC) 10 MG tablet, Take 1 tablet by mouth daily., Disp: 90 tablet, Rfl: 3    Allergies -   Allergies   Allergen Reactions     Penicillins Hives     Ceclor [Cefaclor] Hives     Flonase [Fluticasone] Nausea     Latex      Morphine GI Disturbance     Olopatadine Other (See Comments)     Swelling      Perfume      Eggs GI  "Disturbance       Social History -   Social History     Social History     Marital status:      Spouse name: N/A     Number of children: N/A     Years of education: N/A     Social History Main Topics     Smoking status: Former Smoker     Packs/day: 1.00     Years: 35.00     Types: Cigarettes     Quit date: 6/23/2014     Smokeless tobacco: Never Used     Alcohol use 1.5 - 2.0 oz/week      Comment: 5 x a month     Drug use: No     Sexual activity: Yes     Partners: Male     Birth control/ protection: None     Other Topics Concern     Parent/Sibling W/ Cabg, Mi Or Angioplasty Before 65f 55m? No     Social History Narrative       Family History -   Family History   Problem Relation Age of Onset     Breast Cancer Mother      Hypertension Mother      Breast Cancer Maternal Grandmother      HEART DISEASE Maternal Grandmother      Hypertension Maternal Grandmother        Review of Systems - As per HPI and PMHx, otherwise 7 system review of the head and neck negative.    Physical Exam  Temp 98  F (36.7  C) (Tympanic)  Resp 16  Ht 1.626 m (5' 4\")  Wt 92.5 kg (204 lb)  BMI 35.02 kg/m2  General - The patient is nontoxic, in no distress.  Alert and oriented to person and place, answers questions and cooperates with examination appropriately.   Voice and Breathing - The patient was breathing comfortably without the use of accessory muscles. There was no wheezing, stridor, or stertor.  The patients voice was clear and strong.  Ears - The tympanic membrane on the R is intact, but with a bloody crust inferiorly. I tried to tease this off with a pick, but it was too painful so I left it. It appears dull with a middle ear effusion. No acute infection.  No fluid or purulence was seen in the external canal. The tympanic membrane on the left is intact, + middle ear effusion, but some bubbles. No acute infection.  No fluid or purulence was seen in the external canal.   Nose - Septum midline. Turbinates normal size. Airway " patent bilaterally. No polyps, masses, or pus.   Eyes - Extraocular movements intact. Sclera were not icteric or injected.  Mouth - Examination of the oral cavity showed pink, healthy mucosa. No lesions or ulcerations noted.  The tongue was mobile and midline.  Throat - The walls of the oropharynx were smooth, symmetric, and had no lesions or ulcerations.  The uvula was midline on elevation. Tonsils 1+, quiet.   Neck - Palpation of the occipital, submental, submandibular, internal jugular chain, and supraclavicular nodes did not demonstrate any abnormal lymph nodes or masses. No parotid masses. Palpation of the thyroid was soft and smooth, with no nodules or goiter appreciated.  The trachea was mobile and midline.  Neurological - Cranial nerves 2 through 12 were grossly intact. House-Brackmann grade 1 out of 6 bilaterally.    Assessment and Plan - Kirstin Simmons is a 57 year old female who presents to me today with hearing loss.  This is most consistent with a middle ear effusion in both ears. Her tympanic membranes are healed, but were perforated at one point. This is likely due to transient eustachian tube dysfunction following an upper respiratory infection. I have advised trying a decongestant and daily valsalva to try and improve the eustachian tube function. Can use ofloxacin with hydrogen peroxide to bubble off the right ear crust. The patient will return in a month. If there is still evidence of an effusion I will perform myringotomy with aspiration.     Rufus Guerrero MD  Otolaryngology  [facility]

## 2018-03-09 NOTE — LETTER
3/9/2018         RE: Kirstin Simmons  [address] [facility] [address]        Dear Colleague,    Thank you for referring your patient, Kirstin Simmons, to the [facility]. Please see a copy of my visit note below.    I am seeing this patient in consultation for ear infection at the request of the provider Yumiko Gong.     Chief Complaint - Hearing loss    History of Present Illness - Kirstin Simmons is a 57 year old female who presents to me today with hearing loss or a plugged feeling in both ears.  It has been present and noticeable for approximately 2 weeks. Had bilateral ear pain and otorrhea both sides. This was preceded by an upper respiratory infection. There is no history of chronic ear disease or ear surgery. The patient has no more ear pain, but some pressure. Hearing is down. The patient has tried two courses of antibiotics, but these things have not helped.     Past Medical History -   Patient Active Problem List   Diagnosis     Abnormal Pap smear     CARDIOVASCULAR SCREENING; LDL GOAL LESS THAN 160     Menopause     S/P oophorectomy     Lumbar radiculopathy     Chronic rhinitis     Skin tag     Benign neoplasm of skin of trunk, except scrotum     Backache     Dry eye syndrome     High cholesterol     Skin lesion     Advanced directives, counseling/discussion       Current Medications -   Current Outpatient Prescriptions:      cefdinir (OMNICEF) 300 MG capsule, Take 1 capsule (300 mg) by mouth 2 times daily for 10 days, Disp: 20 capsule, Rfl: 0     hypromellose-dextran 0.3-0.1% (ARTIFICIAL TEARS) opthalmic solution, Place 1 drop into both eyes 3 times daily as needed, Disp: 1 Bottle, Rfl: 0     atorvastatin (LIPITOR) 10 MG tablet, Take 1 tablet (10 mg) by mouth daily, Disp: 90 tablet, Rfl: 3     aspirin 81 MG tablet, Take 1 tablet (81 mg) by mouth daily, Disp: 90 tablet, Rfl: 3     cetirizine (ZYRTEC) 10 MG tablet, Take 1 tablet by mouth daily., Disp: 90 tablet, Rfl: 3    Allergies -  "  Allergies   Allergen Reactions     Penicillins Hives     Ceclor [Cefaclor] Hives     Flonase [Fluticasone] Nausea     Latex      Morphine GI Disturbance     Olopatadine Other (See Comments)     Swelling      Perfume      Eggs GI Disturbance       Social History -   Social History     Social History     Marital status:      Spouse name: N/A     Number of children: N/A     Years of education: N/A     Social History Main Topics     Smoking status: Former Smoker     Packs/day: 1.00     Years: 35.00     Types: Cigarettes     Quit date: 6/23/2014     Smokeless tobacco: Never Used     Alcohol use 1.5 - 2.0 oz/week      Comment: 5 x a month     Drug use: No     Sexual activity: Yes     Partners: Male     Birth control/ protection: None     Other Topics Concern     Parent/Sibling W/ Cabg, Mi Or Angioplasty Before 65f 55m? No     Social History Narrative       Family History -   Family History   Problem Relation Age of Onset     Breast Cancer Mother      Hypertension Mother      Breast Cancer Maternal Grandmother      HEART DISEASE Maternal Grandmother      Hypertension Maternal Grandmother        Review of Systems - As per HPI and PMHx, otherwise 7 system review of the head and neck negative.    Physical Exam  Temp 98  F (36.7  C) (Tympanic)  Resp 16  Ht 1.626 m (5' 4\")  Wt 92.5 kg (204 lb)  BMI 35.02 kg/m2  General - The patient is nontoxic, in no distress.  Alert and oriented to person and place, answers questions and cooperates with examination appropriately.   Voice and Breathing - The patient was breathing comfortably without the use of accessory muscles. There was no wheezing, stridor, or stertor.  The patients voice was clear and strong.  Ears - The tympanic membrane on the R is intact, but with a bloody crust inferiorly. I tried to tease this off with a pick, but it was too painful so I left it. It appears dull with a middle ear effusion. No acute infection.  No fluid or purulence was seen in the " external canal. The tympanic membrane on the left is intact, + middle ear effusion, but some bubbles. No acute infection.  No fluid or purulence was seen in the external canal.   Nose - Septum midline. Turbinates normal size. Airway patent bilaterally. No polyps, masses, or pus.   Eyes - Extraocular movements intact. Sclera were not icteric or injected.  Mouth - Examination of the oral cavity showed pink, healthy mucosa. No lesions or ulcerations noted.  The tongue was mobile and midline.  Throat - The walls of the oropharynx were smooth, symmetric, and had no lesions or ulcerations.  The uvula was midline on elevation. Tonsils 1+, quiet.   Neck - Palpation of the occipital, submental, submandibular, internal jugular chain, and supraclavicular nodes did not demonstrate any abnormal lymph nodes or masses. No parotid masses. Palpation of the thyroid was soft and smooth, with no nodules or goiter appreciated.  The trachea was mobile and midline.  Neurological - Cranial nerves 2 through 12 were grossly intact. House-Brackmann grade 1 out of 6 bilaterally.    Assessment and Plan - Kirstin Simmons is a 57 year old female who presents to me today with hearing loss.  This is most consistent with a middle ear effusion in both ears. Her tympanic membranes are healed, but were perforated at one point. This is likely due to transient eustachian tube dysfunction following an upper respiratory infection. I have advised trying a decongestant and daily valsalva to try and improve the eustachian tube function. Can use ofloxacin with hydrogen peroxide to bubble off the right ear crust. The patient will return in a month. If there is still evidence of an effusion I will perform myringotomy with aspiration.     Rufus Guerrero MD  Otolaryngology  [facility]      Again, thank you for allowing me to participate in the care of your patient.        Sincerely,        Rufus Guerrero MD

## 2018-03-09 NOTE — PATIENT INSTRUCTIONS
General Scheduling Information  To schedule your CT/MRI scan, please contact [facility] at [phone]   [address] [place] [address]    To schedule your Surgery, please contact our Specialty Schedulers at [phone]    ENT Clinic Locations Clinic Hours Telephone Number     [facility]  [address] [place] [address]   Tuesday:       8:00am -- 4:00pm    Wednesday:  8:00am - 4:00pm   To schedule an appointment with   Dr. Guerrero,   please contact our   Specialty Scheduling Department at:     [phone]       [facility]  [address] [place] [address]   Friday:          8:00am - 4:00pm         Urgent Care Locations Clinic Hours Telephone Numbers     [facility]  [address] [place] [address]     Monday-Friday:     11:00pm - 9:00pm    Saturday-Sunday:  9:00am - 5:00pm   [phone]     [facility]  [address] [place] [address]     Monday-Friday:      5:00pm - 9:00pm     Saturday-Sunday:  9:00am - 5:00pm   [phone]

## 2018-03-09 NOTE — MR AVS SNAPSHOT
After Visit Summary   3/9/2018    Kirstin Simmons    MRN: [id number]           Patient Information     Date Of Birth          1960        Visit Information        Provider Department      3/9/2018 10:15 AM Rufus Guerrero MD [facility]        Today's Diagnoses     OME (otitis media with effusion), right    -  1    OME (otitis media with effusion), left          Care Instructions    General Scheduling Information  To schedule your CT/MRI scan, please contact [facility] at [phone] [address] [place] [address]    To schedule your Surgery, please contact our Specialty Schedulers at [phone]    ENT Clinic Locations Clinic Hours Telephone Number     [facility] [facility]  [address]  [place] [address]   Tuesday:       8:00am -- 4:00pm    Wednesday:  8:00am - 4:00pm   To schedule an appointment with   Dr. Guerrero,   please contact our   Specialty Scheduling Department at:     [phone]       [facility]  [address] [place] [address]   Friday:          8:00am - 4:00pm         Urgent Care Locations Clinic Hours Telephone Numbers     [facility]  [address] [place] [address]     Monday-Friday:     11:00pm - 9:00pm    Saturday-Sunday:  9:00am - 5:00pm   [phone]     [facility]  [address] [place] [address]     Monday-Friday:      5:00pm - 9:00pm     Saturday-Sunday:  9:00am - 5:00pm   [phone]                 Follow-ups after your visit        Who to contact     If you have questions or need follow up information about today's clinic visit or your schedule please contact [facility] directly at [phone].  Normal or non-critical lab and imaging results will be communicated to you by MyChart, letter or phone within 4 business days after the clinic has received the results. If you do not hear from us within 7 days, please contact the clinic through MyChart or phone. If you  "have a critical or abnormal lab result, we will notify you by phone as soon as possible.  Submit refill requests through [employer] or call your pharmacy and they will forward the refill request to us. Please allow 3 business days for your refill to be completed.          Additional Information About Your Visit        [employer] Information     [employer] gives you secure access to your electronic health record. If you see a primary care provider, you can also send messages to your care team and make appointments. If you have questions, please call your primary care clinic.  If you do not have a primary care provider, please call [phone] and they will assist you.        Care EveryWhere ID     This is your Care EveryWhere ID. This could be used by other organizations to access your [facility] medical records  [id number]        Your Vitals Were     Temperature Respirations Height BMI (Body Mass Index)          98  F (36.7  C) (Tympanic) 16 1.626 m (5' 4\") 35.02 kg/m2         Blood Pressure from Last 3 Encounters:   03/04/18 152/86   02/27/18 134/80   08/22/17 138/80    Weight from Last 3 Encounters:   03/09/18 92.5 kg (204 lb)   03/04/18 92.1 kg (203 lb)   02/27/18 92.5 kg (204 lb)              Today, you had the following     No orders found for display         Today's Medication Changes          These changes are accurate as of 3/9/18 10:54 AM.  If you have any questions, ask your nurse or doctor.               Start taking these medicines.        Dose/Directions    ofloxacin 0.3 % otic solution   Commonly known as:  FLOXIN   Used for:  OME (otitis media with effusion), right   Started by:  Rufus Guerrero MD        Dose:  5 drop   Place 5 drops into the right ear 2 times daily for 7 days   Quantity:  10 mL   Refills:  0            Where to get your medicines      These medications were sent to [facility] - [place], [place] - [address]  [address] [address]     Phone:  " [phone]     ofloxacin 0.3 % otic solution                Primary Care Provider Office Phone # Fax #    Rigo Sukh Cerda [phone] [address] [facility] [address]        Equal Access to Services     LORENLAKEISHA YESSENIA : Hadii aad ku hadchloeo Soomaali, waaxda luqadaha, qaybta kaalmada adeegyada, waxay idiin haysauravn adesteven cespedes petr pimentel. So [facility] [phone].    ATENCIÓN: Si habla español, tiene a zuleta disposición servicios gratuitos de asistencia lingüística. Llame al [phone].    We comply with applicable federal civil rights laws and [place] laws. We do not discriminate on the basis of race, color, national origin, age, disability, sex, sexual orientation, or gender identity.            Thank you!     Thank you for choosing [facility]  for your care. Our goal is always to provide you with excellent care. Hearing back from our patients is one way we can continue to improve our services. Please take a few minutes to complete the written survey that you may receive in the mail after your visit with us. Thank you!             Your Updated Medication List - Protect others around you: Learn how to safely use, store and throw away your medicines at www.disposemymeds.org.          This list is accurate as of 3/9/18 10:54 AM.  Always use your most recent med list.                   Brand Name Dispense Instructions for use Diagnosis    aspirin 81 MG tablet     90 tablet    Take 1 tablet (81 mg) by mouth daily        atorvastatin 10 MG tablet    LIPITOR    90 tablet    Take 1 tablet (10 mg) by mouth daily    High cholesterol       cefdinir 300 MG capsule    OMNICEF    20 capsule    Take 1 capsule (300 mg) by mouth 2 times daily for 10 days    Otitis media, serous, tm rupture, right       cetirizine 10 MG tablet    zyrTEC    90 tablet    Take 1 tablet by mouth daily.    Acute maxillary sinusitis       hypromellose-dextran 0.3-0.1% opthalmic solution     1 Bottle    Place 1  drop into both eyes 3 times daily as needed    Eye irritation       ofloxacin 0.3 % otic solution    FLOXIN    10 mL    Place 5 drops into the right ear 2 times daily for 7 days    OME (otitis media with effusion), right

## 2018-06-01 ENCOUNTER — TELEPHONE (OUTPATIENT)
Dept: FAMILY MEDICINE | Facility: CLINIC | Age: 58
End: 2018-06-01

## 2018-06-01 DIAGNOSIS — Z12.11 SPECIAL SCREENING FOR MALIGNANT NEOPLASMS, COLON: Primary | ICD-10-CM

## 2018-06-01 NOTE — TELEPHONE ENCOUNTER
Called the patient for Health Maintenance, agreed to FIT test screening. Order placed for provider to sign.     The patient request FIT be sent to home address:  [address] [facility] [address]

## 2018-06-05 ENCOUNTER — OFFICE VISIT (OUTPATIENT)
Dept: FAMILY MEDICINE | Facility: CLINIC | Age: 58
End: 2018-06-05
Payer: COMMERCIAL

## 2018-06-05 VITALS
BODY MASS INDEX: 34.84 KG/M2 | OXYGEN SATURATION: 98 % | SYSTOLIC BLOOD PRESSURE: 139 MMHG | TEMPERATURE: 97.2 F | DIASTOLIC BLOOD PRESSURE: 78 MMHG | RESPIRATION RATE: 18 BRPM | HEART RATE: 89 BPM | WEIGHT: 203 LBS

## 2018-06-05 DIAGNOSIS — H10.33 ACUTE BACTERIAL CONJUNCTIVITIS OF BOTH EYES: Primary | ICD-10-CM

## 2018-06-05 PROCEDURE — 99213 OFFICE O/P EST LOW 20 MIN: CPT | Performed by: FAMILY MEDICINE

## 2018-06-05 RX ORDER — OFLOXACIN 3 MG/ML
1 SOLUTION/ DROPS OPHTHALMIC EVERY 4 HOURS
Qty: 1 BOTTLE | Refills: 0 | Status: SHIPPED | OUTPATIENT
Start: 2018-06-05 | End: 2018-09-27

## 2018-06-05 ASSESSMENT — PAIN SCALES - GENERAL: PAINLEVEL: NO PAIN (0)

## 2018-06-05 NOTE — NURSING NOTE
"Chief Complaint   Patient presents with     Conjunctivitis       Initial /78  Pulse 89  Temp 97.2  F (36.2  C) (Oral)  Resp 18  Wt 203 lb (92.1 kg)  SpO2 98%  BMI 34.84 kg/m2 Estimated body mass index is 34.84 kg/(m^2) as calculated from the following:    Height as of 3/9/18: 5' 4\" (1.626 m).    Weight as of this encounter: 203 lb (92.1 kg).  Medication Reconciliation: complete  Daksha Polk M.A.    "

## 2018-06-05 NOTE — MR AVS SNAPSHOT
After Visit Summary   6/5/2018    Kirstin Simmons    MRN: [id number]           Patient Information     Date Of Birth          1960        Visit Information        Provider Department      6/5/2018 2:45 PM Rigo Cerda MD [facility]        Today's Diagnoses     Acute bacterial conjunctivitis of both eyes    -  1       Follow-ups after your visit        Who to contact     If you have questions or need follow up information about today's clinic visit or your schedule please contact [facility] directly at [phone].  Normal or non-critical lab and imaging results will be communicated to you by RMDM[employer]ou[employer]art, letter or phone within 4 business days after the clinic has received the results. If you do not hear from us within 7 days, please contact the clinic through Miii[employer] or phone. If you have a critical or abnormal lab result, we will notify you by phone as soon as possible.  Submit refill requests through [employer] or call your pharmacy and they will forward the refill request to us. Please allow 3 business days for your refill to be completed.          Additional Information About Your Visit        MyChart Information     [employer] gives you secure access to your electronic health record. If you see a primary care provider, you can also send messages to your care team and make appointments. If you have questions, please call your primary care clinic.  If you do not have a primary care provider, please call [phone] and they will assist you.        Care EveryWhere ID     This is your Care EveryWhere ID. This could be used by other organizations to access your [place] medical records  [id number]        Your Vitals Were     Pulse Temperature Respirations Pulse Oximetry BMI (Body Mass Index)       89 97.2  F (36.2  C) (Oral) 18 98% 34.84 kg/m2        Blood Pressure from Last 3 Encounters:   06/05/18 139/78   03/04/18 152/86   02/27/18 134/80    Weight from Last 3  Encounters:   06/05/18 203 lb (92.1 kg)   03/09/18 204 lb (92.5 kg)   03/04/18 203 lb (92.1 kg)              Today, you had the following     No orders found for display         Today's Medication Changes          These changes are accurate as of 6/5/18  3:34 PM.  If you have any questions, ask your nurse or doctor.               Start taking these medicines.        Dose/Directions    ofloxacin 0.3 % ophthalmic solution   Commonly known as:  OCUFLOX   Used for:  Acute bacterial conjunctivitis of both eyes        Dose:  1 drop   Apply 1 drop to eye every 4 hours   Quantity:  1 Bottle   Refills:  0            Where to get your medicines      These medications were sent to [facility] [address] [place] [address] [address] [facility]  [address] [facility] [address]     Phone:  [phone]     ofloxacin 0.3 % ophthalmic solution                Primary Care Provider Office Phone # Fax #    Rigo Sukh Cerda [phone] [address] [facility] [address]        Equal Access to Services     [facility]: Hadii aad ku hadasho Soomaali, waaxda luqadaha, qaybta kaalmada adeegyada, waxay idiin haychristina humphreys . So [facility] [phone].    ATENCIÓN: Si habla español, tiene a zuleta disposición servicios gratuitos de asistencia lingüística. Llame al [phone].    We comply with applicable federal civil rights laws and [place] laws. We do not discriminate on the basis of race, color, national origin, age, disability, sex, sexual orientation, or gender identity.            Thank you!     Thank you for choosing [facility]  for your care. Our goal is always to provide you with excellent care. Hearing back from our patients is one way we can continue to improve our services. Please take a few minutes to complete the written survey that you may receive in the mail after your visit with us. Thank you!             Your Updated Medication List - Protect others around you:  Learn how to safely use, store and throw away your medicines at www.disposemymeds.org.          This list is accurate as of 6/5/18  3:34 PM.  Always use your most recent med list.                   Brand Name Dispense Instructions for use Diagnosis    aspirin 81 MG tablet     90 tablet    Take 1 tablet (81 mg) by mouth daily        atorvastatin 10 MG tablet    LIPITOR    90 tablet    Take 1 tablet (10 mg) by mouth daily    High cholesterol       cetirizine 10 MG tablet    zyrTEC    90 tablet    Take 1 tablet by mouth daily.    Acute maxillary sinusitis       ofloxacin 0.3 % ophthalmic solution    OCUFLOX    1 Bottle    Apply 1 drop to eye every 4 hours    Acute bacterial conjunctivitis of both eyes

## 2018-06-05 NOTE — PROGRESS NOTES
SUBJECTIVE:  57 year old.The patient has a history of exposure to pink eye.  This started one day ago. Location both eyes. quality mattering Associated symptoms are red eyes. ROS slight nasal congestion. No fever    Reviewed health maintenance  Patient Active Problem List   Diagnosis     Abnormal Pap smear     CARDIOVASCULAR SCREENING; LDL GOAL LESS THAN 160     Menopause     S/P oophorectomy     Lumbar radiculopathy     Chronic rhinitis     Skin tag     Benign neoplasm of skin of trunk, except scrotum     Backache     Dry eye syndrome     High cholesterol     Skin lesion     Advanced directives, counseling/discussion     Past Medical History:   Diagnosis Date     Abdominal or pelvic swelling, mass or lump, unspecified site      Abnormal glandular Papanicolaou smear of cervix      Abnormal Pap smear 09/06/2006     Arthritis      Backache, unspecified      Cancer (H)     Grandmother, mother     Congestive heart failure (H)     Grandmother     Insomnia, unspecified      Other specified trigeminal nerve disorders      Thoracic or lumbosacral neuritis or radiculitis, unspecified      Thyroid disease     Daughter     Unspecified disorder of menstruation and other abnormal bleeding from female genital tract      Unspecified symptom associated with female genital organs      Viral warts, unspecified        OBJECTIVE:  no apparent distress  /78  Pulse 89  Temp 97.2  F (36.2  C) (Oral)  Resp 18  Wt 203 lb (92.1 kg)  SpO2 98%  BMI 34.84 kg/m2  sclera conjunctiva slight .erythma  PERRLA and EOM intact  Slight exudate         ICD-10-CM    1. Acute bacterial conjunctivitis of both eyes H10.33 ofloxacin (OCUFLOX) 0.3 % ophthalmic solution    PLAN: reassuarnace

## 2018-08-31 ENCOUNTER — RADIANT APPOINTMENT (OUTPATIENT)
Dept: MAMMOGRAPHY | Facility: CLINIC | Age: 58
End: 2018-08-31
Payer: COMMERCIAL

## 2018-08-31 DIAGNOSIS — Z12.31 VISIT FOR SCREENING MAMMOGRAM: ICD-10-CM

## 2018-08-31 PROCEDURE — 77067 SCR MAMMO BI INCL CAD: CPT | Mod: TC

## 2018-09-18 ENCOUNTER — DOCUMENTATION ONLY (OUTPATIENT)
Dept: LAB | Facility: CLINIC | Age: 58
End: 2018-09-18

## 2018-09-18 DIAGNOSIS — E78.00 HIGH CHOLESTEROL: Primary | ICD-10-CM

## 2018-09-18 NOTE — PROGRESS NOTES
Please review lab orders sign and close encounter. Ramya Gann TC    Pvl 9/21/18    Cholesterol chk 9/27/18

## 2018-09-18 NOTE — PROGRESS NOTES
Patient has an upcoming previsit appointment on 09/21/2018. Please review pended orders and add additional orders if needed.     Thank you,   Sherrie Hernandes

## 2018-09-21 DIAGNOSIS — E78.00 HIGH CHOLESTEROL: ICD-10-CM

## 2018-09-21 LAB
CHOLEST SERPL-MCNC: 162 MG/DL
HDLC SERPL-MCNC: 42 MG/DL
LDLC SERPL CALC-MCNC: 89 MG/DL
NONHDLC SERPL-MCNC: 120 MG/DL
TRIGL SERPL-MCNC: 154 MG/DL

## 2018-09-21 PROCEDURE — 80061 LIPID PANEL: CPT | Performed by: FAMILY MEDICINE

## 2018-09-21 PROCEDURE — 36415 COLL VENOUS BLD VENIPUNCTURE: CPT | Performed by: FAMILY MEDICINE

## 2018-09-27 ENCOUNTER — OFFICE VISIT (OUTPATIENT)
Dept: FAMILY MEDICINE | Facility: CLINIC | Age: 58
End: 2018-09-27
Payer: COMMERCIAL

## 2018-09-27 VITALS
SYSTOLIC BLOOD PRESSURE: 138 MMHG | HEART RATE: 86 BPM | OXYGEN SATURATION: 96 % | HEIGHT: 64 IN | WEIGHT: 205 LBS | BODY MASS INDEX: 35 KG/M2 | RESPIRATION RATE: 16 BRPM | DIASTOLIC BLOOD PRESSURE: 88 MMHG | TEMPERATURE: 97.4 F

## 2018-09-27 DIAGNOSIS — E78.00 HIGH CHOLESTEROL: ICD-10-CM

## 2018-09-27 DIAGNOSIS — Z12.11 SPECIAL SCREENING FOR MALIGNANT NEOPLASMS, COLON: Primary | ICD-10-CM

## 2018-09-27 DIAGNOSIS — Z23 NEED FOR PROPHYLACTIC VACCINATION AND INOCULATION AGAINST INFLUENZA: ICD-10-CM

## 2018-09-27 PROCEDURE — 90682 RIV4 VACC RECOMBINANT DNA IM: CPT | Performed by: FAMILY MEDICINE

## 2018-09-27 PROCEDURE — 99213 OFFICE O/P EST LOW 20 MIN: CPT | Mod: 25 | Performed by: FAMILY MEDICINE

## 2018-09-27 PROCEDURE — 90471 IMMUNIZATION ADMIN: CPT | Performed by: FAMILY MEDICINE

## 2018-09-27 RX ORDER — ATORVASTATIN CALCIUM 10 MG/1
10 TABLET, FILM COATED ORAL DAILY
Qty: 90 TABLET | Refills: 3 | Status: SHIPPED | OUTPATIENT
Start: 2018-09-27 | End: 2019-10-09

## 2018-09-27 ASSESSMENT — PAIN SCALES - GENERAL: PAINLEVEL: NO PAIN (0)

## 2018-09-27 NOTE — PROGRESS NOTES

## 2018-09-27 NOTE — NURSING NOTE
"Chief Complaint   Patient presents with     Lipids       Initial /87  Pulse 86  Temp 97.4  F (36.3  C) (Oral)  Resp 16  Ht 5' 4\" (1.626 m)  Wt 205 lb (93 kg)  SpO2 96%  BMI 35.19 kg/m2 Estimated body mass index is 35.19 kg/(m^2) as calculated from the following:    Height as of this encounter: 5' 4\" (1.626 m).    Weight as of this encounter: 205 lb (93 kg).  Medication Reconciliation: complete  Daksha Polk M.A.    "

## 2018-09-27 NOTE — PROGRESS NOTES
SUBJECTIVE:  57 year old enters for recheck of high cholesterol.  Pt. Has been taking med and has no side effects. Pt is following diet.  Denies chest pain and SOB.  Past Medical History:   Diagnosis Date     Abdominal or pelvic swelling, mass or lump, unspecified site      Abnormal glandular Papanicolaou smear of cervix      Abnormal Pap smear 09/06/2006     Arthritis      Backache, unspecified      Cancer (H)     Grandmother, mother     Congestive heart failure (H)     Grandmother     Insomnia, unspecified      Other specified trigeminal nerve disorders      Thoracic or lumbosacral neuritis or radiculitis, unspecified      Thyroid disease     Daughter     Unspecified disorder of menstruation and other abnormal bleeding from female genital tract      Unspecified symptom associated with female genital organs      Viral warts, unspecified      Past Surgical History:   Procedure Laterality Date     ABDOMEN SURGERY      Gall bladder removed. Fallopian tube removed     C OOPHORECTORMY FOR JALEEL, W/BX  09/29/2006    left ovary and fallopian tube, left salpingo-oophorectomy     CHOLECYSTECTOMY, LAPOROSCOPIC      Cholecystectomy, Laparoscopic       Current Outpatient Prescriptions:      aspirin 81 MG tablet, Take 1 tablet (81 mg) by mouth daily, Disp: 90 tablet, Rfl: 3     atorvastatin (LIPITOR) 10 MG tablet, Take 1 tablet (10 mg) by mouth daily, Disp: 90 tablet, Rfl: 3     cetirizine (ZYRTEC) 10 MG tablet, Take 1 tablet by mouth daily., Disp: 90 tablet, Rfl: 3     [DISCONTINUED] atorvastatin (LIPITOR) 10 MG tablet, Take 1 tablet (10 mg) by mouth daily, Disp: 90 tablet, Rfl: 3  Reviewed health maintenance   Patient Active Problem List   Diagnosis     Abnormal Pap smear     CARDIOVASCULAR SCREENING; LDL GOAL LESS THAN 160     Menopause     S/P oophorectomy     Lumbar radiculopathy     Chronic rhinitis     Skin tag     Benign neoplasm of skin of trunk, except scrotum     Backache     Dry eye syndrome     High cholesterol      "Skin lesion     Advanced directives, counseling/discussion       OBJECTIVE:  no apparent distress  /88  Pulse 86  Temp 97.4  F (36.3  C) (Oral)  Resp 16  Ht 5' 4\" (1.626 m)  Wt 205 lb (93 kg)  SpO2 96%  BMI 35.19 kg/m2      Exam:  Constitutional: healthy, alert and no distress  Head: Normocephalic. No masses, lesions, tenderness or abnormalities  Neck: Neck supple. No adenopathy. Thyroid symmetric, normal size,  Cardiovascular: negative, PMI normal. No lifts, heaves, or thrills. RRR. No murmurs, clicks gallops or rub  Respiratory: negative Lungs clear    Orders Only on 09/21/2018   Component Date Value Ref Range Status     Cholesterol 09/21/2018 162  <200 mg/dL Final     Triglycerides 09/21/2018 154* <150 mg/dL Final    Comment: Borderline high:  150-199 mg/dl  High:             200-499 mg/dl  Very high:       >499 mg/dl  Fasting specimen       HDL Cholesterol 09/21/2018 42* >49 mg/dL Final     LDL Cholesterol Calculated 09/21/2018 89  <100 mg/dL Final    Desirable:       <100 mg/dl     Non HDL Cholesterol 09/21/2018 120  <130 mg/dL Final           ICD-10-CM    1. Special screening for malignant neoplasms, colon Z12.11    2. High cholesterol E78.00 Fecal colorectal cancer screen (FIT)     atorvastatin (LIPITOR) 10 MG tablet   3. Need for prophylactic vaccination and inoculation against influenza Z23 FLU VACCINE, (RIV4) RECOMBINANT HA  , IM (FluBlok, egg free) [70515]- >18 YRS (G recommended  50-64 YRS)     Vaccine Administration, Initial [86850]    PLAN: Follow up in 1 year     "

## 2018-09-27 NOTE — MR AVS SNAPSHOT
After Visit Summary   9/27/2018    Kirstin Simmons    MRN: [id number]           Patient Information     Date Of Birth          1960        Visit Information        Provider Department      9/27/2018 8:30 AM Rigo Cerda MD [facility]        Today's Diagnoses     Special screening for malignant neoplasms, colon    -  1    High cholesterol        Need for prophylactic vaccination and inoculation against influenza           Follow-ups after your visit        Future tests that were ordered for you today     Open Future Orders        Priority Expected Expires Ordered    Fecal colorectal cancer screen (FIT) Routine 10/18/2018 12/20/2018 9/27/2018            Who to contact     If you have questions or need follow up information about today's clinic visit or your schedule please contact [facility] directly at [phone].  Normal or non-critical lab and imaging results will be communicated to you by MyChart, letter or phone within 4 business days after the clinic has received the results. If you do not hear from us within 7 days, please contact the clinic through in[employer] or phone. If you have a critical or abnormal lab result, we will notify you by phone as soon as possible.  Submit refill requests through [employer] or call your pharmacy and they will forward the refill request to us. Please allow 3 business days for your refill to be completed.          Additional Information About Your Visit        MyChart Information     [employer] gives you secure access to your electronic health record. If you see a primary care provider, you can also send messages to your care team and make appointments. If you have questions, please call your primary care clinic.  If you do not have a primary care provider, please call [phone] and they will assist you.        Care EveryWhere ID     This is your Care EveryWhere ID. This could be used by other organizations to access your [place]  "medical records  [id number]        Your Vitals Were     Pulse Temperature Respirations Height Pulse Oximetry BMI (Body Mass Index)    86 97.4  F (36.3  C) (Oral) 16 5' 4\" (1.626 m) 96% 35.19 kg/m2       Blood Pressure from Last 3 Encounters:   09/27/18 138/88   06/05/18 139/78   03/04/18 152/86    Weight from Last 3 Encounters:   09/27/18 205 lb (93 kg)   06/05/18 203 lb (92.1 kg)   03/09/18 204 lb (92.5 kg)              We Performed the Following     FLU VACCINE, (RIV4) RECOMBINANT HA  , IM (FluBlok, egg free) [46245]- >18 YRS ([facility] recommended  50-64 YRS)     Vaccine Administration, Initial [48588]          Where to get your medicines      These medications were sent to [facility] [address] - [place], [place] - [address] [facility]  [address] [facility] [address]     Phone:  [phone]     atorvastatin 10 MG tablet          Primary Care Provider Office Phone # Fax #    Rigo Sukh Cerda [phone] [address] [facility] [address]        Equal Access to Services     [facility]: Hadii july ku hadasho Soomaali, waaxda luqadaha, qaybta kaalmada adeegyada, manju pimentel. So [facility] [phone].    ATENCIÓN: Si habla español, tiene a zuleta disposición servicios gratuitos de asistencia lingüística. Llame al [phone].    We comply with applicable federal civil rights laws and [place] laws. We do not discriminate on the basis of race, color, national origin, age, disability, sex, sexual orientation, or gender identity.            Thank you!     Thank you for choosing [facility]  for your care. Our goal is always to provide you with excellent care. Hearing back from our patients is one way we can continue to improve our services. Please take a few minutes to complete the written survey that you may receive in the mail after your visit with us. Thank you!             Your Updated Medication List - Protect others around you: Learn how to " safely use, store and throw away your medicines at www.disposemymeds.org.          This list is accurate as of 9/27/18  9:22 AM.  Always use your most recent med list.                   Brand Name Dispense Instructions for use Diagnosis    aspirin 81 MG tablet     90 tablet    Take 1 tablet (81 mg) by mouth daily        atorvastatin 10 MG tablet    LIPITOR    90 tablet    Take 1 tablet (10 mg) by mouth daily    High cholesterol       cetirizine 10 MG tablet    zyrTEC    90 tablet    Take 1 tablet by mouth daily.    Acute maxillary sinusitis

## 2018-12-10 ENCOUNTER — OFFICE VISIT (OUTPATIENT)
Dept: FAMILY MEDICINE | Facility: CLINIC | Age: 58
End: 2018-12-10
Payer: COMMERCIAL

## 2018-12-10 VITALS
DIASTOLIC BLOOD PRESSURE: 87 MMHG | HEART RATE: 84 BPM | BODY MASS INDEX: 34.66 KG/M2 | HEIGHT: 64 IN | OXYGEN SATURATION: 97 % | RESPIRATION RATE: 18 BRPM | TEMPERATURE: 98.1 F | SYSTOLIC BLOOD PRESSURE: 149 MMHG | WEIGHT: 203 LBS

## 2018-12-10 DIAGNOSIS — F17.200 SMOKER: ICD-10-CM

## 2018-12-10 DIAGNOSIS — I10 ESSENTIAL HYPERTENSION: Primary | ICD-10-CM

## 2018-12-10 PROCEDURE — 99213 OFFICE O/P EST LOW 20 MIN: CPT | Performed by: FAMILY MEDICINE

## 2018-12-10 RX ORDER — LISINOPRIL 5 MG/1
5 TABLET ORAL DAILY
Qty: 30 TABLET | Refills: 1 | Status: SHIPPED | OUTPATIENT
Start: 2018-12-10 | End: 2019-02-10

## 2018-12-10 ASSESSMENT — MIFFLIN-ST. JEOR: SCORE: 1480.8

## 2018-12-10 ASSESSMENT — PAIN SCALES - GENERAL: PAINLEVEL: NO PAIN (0)

## 2018-12-10 NOTE — NURSING NOTE
"Chief Complaint   Patient presents with     Hypertension     Smoking Cessation       Initial /87   Pulse 84   Temp 98.1  F (36.7  C) (Oral)   Resp 18   Ht 1.626 m (5' 4\")   Wt 92.1 kg (203 lb)   SpO2 97%   BMI 34.84 kg/m   Estimated body mass index is 34.84 kg/m  as calculated from the following:    Height as of this encounter: 1.626 m (5' 4\").    Weight as of this encounter: 92.1 kg (203 lb).  Medication Reconciliation: sara Polk M.A.    "

## 2018-12-10 NOTE — PROGRESS NOTES
SUBJECTIVE:  58 year oldyear old female enters with  hypertension.  Pt. Has not been takng medication.  medications were reviewed.  No side effects. No chest pain or sob. Low sodium diet.    Current Outpatient Medications:      aspirin 81 MG tablet, Take 1 tablet (81 mg) by mouth daily, Disp: 90 tablet, Rfl: 3     atorvastatin (LIPITOR) 10 MG tablet, Take 1 tablet (10 mg) by mouth daily, Disp: 90 tablet, Rfl: 3     cetirizine (ZYRTEC) 10 MG tablet, Take 1 tablet by mouth daily., Disp: 90 tablet, Rfl: 3     lisinopril (PRINIVIL/ZESTRIL) 5 MG tablet, Take 1 tablet (5 mg) by mouth daily, Disp: 30 tablet, Rfl: 1     nicotine (NICODERM CQ) 7 MG/24HR 24 hr patch, Place 1 patch onto the skin every 24 hours, Disp: 90 patch, Rfl: 3  Past Medical History:   Diagnosis Date     Abdominal or pelvic swelling, mass or lump, unspecified site      Abnormal glandular Papanicolaou smear of cervix      Abnormal Pap smear 09/06/2006     Arthritis      Backache, unspecified      Cancer (H)     Grandmother, mother     Congestive heart failure (H)     Grandmother     Insomnia, unspecified      Other specified trigeminal nerve disorders      Thoracic or lumbosacral neuritis or radiculitis, unspecified      Thyroid disease     Daughter     Unspecified disorder of menstruation and other abnormal bleeding from female genital tract      Unspecified symptom associated with female genital organs      Viral warts, unspecified      Orders Only on 09/21/2018   Component Date Value Ref Range Status     Cholesterol 09/21/2018 162  <200 mg/dL Final     Triglycerides 09/21/2018 154* <150 mg/dL Final    Comment: Borderline high:  150-199 mg/dl  High:             200-499 mg/dl  Very high:       >499 mg/dl  Fasting specimen       HDL Cholesterol 09/21/2018 42* >49 mg/dL Final     LDL Cholesterol Calculated 09/21/2018 89  <100 mg/dL Final    Desirable:       <100 mg/dl     Non HDL Cholesterol 09/21/2018 120  <130 mg/dL Final      Reviewed health  "maintenance  Patient Active Problem List   Diagnosis     Abnormal Pap smear     CARDIOVASCULAR SCREENING; LDL GOAL LESS THAN 160     Menopause     S/P oophorectomy     Lumbar radiculopathy     Chronic rhinitis     Skin tag     Benign neoplasm of skin of trunk, except scrotum     Backache     Dry eye syndrome     High cholesterol     Skin lesion     Advanced directives, counseling/discussion         OBJECTIVE:  no apparent distress  /87   Pulse 84   Temp 98.1  F (36.7  C) (Oral)   Resp 18   Ht 1.626 m (5' 4\")   Wt 92.1 kg (203 lb)   SpO2 97%   BMI 34.84 kg/m       Head: Normocephalic. No masses, lesions, tenderness or abnormalities.  Neck::Neck supple. No adenopathy. Thyroid symmetric, normal size.    Cardiovascular: negative. No lifts, heaves, or thrills. RRR. No murmurs, clicks gallops or rubs  Respiratory. Good diaphragmatic excursion. Lungs clear  Gastrointestinal:Abdomen soft, non-tender.  No masses, organomegaly    Orders Only on 09/21/2018   Component Date Value Ref Range Status     Cholesterol 09/21/2018 162  <200 mg/dL Final     Triglycerides 09/21/2018 154* <150 mg/dL Final    Comment: Borderline high:  150-199 mg/dl  High:             200-499 mg/dl  Very high:       >499 mg/dl  Fasting specimen       HDL Cholesterol 09/21/2018 42* >49 mg/dL Final     LDL Cholesterol Calculated 09/21/2018 89  <100 mg/dL Final    Desirable:       <100 mg/dl     Non HDL Cholesterol 09/21/2018 120  <130 mg/dL Final         ICD-10-CM    1. Essential hypertension I10 lisinopril (PRINIVIL/ZESTRIL) 5 MG tablet   2. Smoker F17.200 nicotine (NICODERM CQ) 7 MG/24HR 24 hr patch    PLAN: follow up in pharmacy for blood pressure see instructions.    SUBJECTIVE:  58 year old.The patient has a complaint of smoking.  This started 6 months ago. quality 6 per day. Associated symptoms are cough at night.   Reviewed health maintenance  Patient Active Problem List   Diagnosis     Abnormal Pap smear     CARDIOVASCULAR SCREENING; LDL " "GOAL LESS THAN 160     Menopause     S/P oophorectomy     Lumbar radiculopathy     Chronic rhinitis     Skin tag     Benign neoplasm of skin of trunk, except scrotum     Backache     Dry eye syndrome     High cholesterol     Skin lesion     Advanced directives, counseling/discussion     Past Medical History:   Diagnosis Date     Abdominal or pelvic swelling, mass or lump, unspecified site      Abnormal glandular Papanicolaou smear of cervix      Abnormal Pap smear 09/06/2006     Arthritis      Backache, unspecified      Cancer (H)     Grandmother, mother     Congestive heart failure (H)     Grandmother     Insomnia, unspecified      Other specified trigeminal nerve disorders      Thoracic or lumbosacral neuritis or radiculitis, unspecified      Thyroid disease     Daughter     Unspecified disorder of menstruation and other abnormal bleeding from female genital tract      Unspecified symptom associated with female genital organs      Viral warts, unspecified        OBJECTIVE:  no apparent distress  /87   Pulse 84   Temp 98.1  F (36.7  C) (Oral)   Resp 18   Ht 1.626 m (5' 4\")   Wt 92.1 kg (203 lb)   SpO2 97%   BMI 34.84 kg/m      LUNGS:  CTA B/L, no wheezing or crackles.   Cardiovascular: negative, PMI normal. No lifts, heaves, or thrills. RRR. No murmurs, clicks gallops or rub   Gastrointestinal: Abdomen soft, non-tender. BS normal. No masses, organomegaly       ICD-10-CM    1. Essential hypertension I10 lisinopril (PRINIVIL/ZESTRIL) 5 MG tablet   2. Smoker F17.200 nicotine (NICODERM CQ) 7 MG/24HR 24 hr patch    PLAN: Retun to clinic if not successful           "

## 2018-12-17 ENCOUNTER — TELEPHONE (OUTPATIENT)
Dept: FAMILY MEDICINE | Facility: CLINIC | Age: 58
End: 2018-12-17

## 2018-12-27 ENCOUNTER — ALLIED HEALTH/NURSE VISIT (OUTPATIENT)
Dept: FAMILY MEDICINE | Facility: CLINIC | Age: 58
End: 2018-12-27
Payer: COMMERCIAL

## 2018-12-27 VITALS — DIASTOLIC BLOOD PRESSURE: 86 MMHG | SYSTOLIC BLOOD PRESSURE: 142 MMHG | HEART RATE: 84 BPM

## 2018-12-27 DIAGNOSIS — Z01.30 BLOOD PRESSURE CHECK: Primary | ICD-10-CM

## 2018-12-27 PROCEDURE — 99207 ZZC NO CHARGE NURSE ONLY: CPT | Performed by: FAMILY MEDICINE

## 2018-12-27 NOTE — PROGRESS NOTES
Kirstin Simmons is enrolled/participating in the retail pharmacy Blood Pressure Goals Achievement Program (BPGAP).  Kirstin Simmons was evaluated at [facility] on December 27, 2018 at which time her blood pressure was:    BP Readings from Last 3 Encounters:   12/27/18 142/86   12/10/18 149/87   09/27/18 138/88     Reviewed lifestyle modifications for blood pressure control and reduction: including making healthy food choices, managing weight, getting regular exercise, smoking cessation, reducing alcohol consumption, monitoring blood pressure regularly.     Kirstin Simmons is not experiencing symptoms.    Follow-Up: BP is not at goal of < 140/90mmHg (patient 18+ years of age with or without diabetes), Recommended follow-up with PCP.  Routing to PCP for further review.    Recommendation to Provider: None    Kirstin Simmons was evaluated for enrollment into the PGEN study today.    PLEASE INITIATE ENROLLMENT DISCUSSION WITH HTN PTS  1) Between 30-80 years old                                                                                                               2) BMI between 19-50                                                                                                        3) BP ?140/90 AND ?170/110 patients aged 30-59         BP ?150/90 AND ?170/110 non-diabetic patients aged 60-80       BP ?140/90 AND ?170/110 diabetic patients aged 60-80  4) Additional requirements for uncontrolled HTN patients:        Pt on only 1 class of medication  5) EXCLUDE patient if confirmation of:                  ? Cardiac disease                  ? Chronic Kidney Disease                  ? Pregnancy/Breastfeeding                  ? Secondary Hypertension/Pre-eclampsia                                 ? Vascular disease    Patient eligible for enrollment:  Unknown  Patient interested in enrollment:  Unknown    This note completed by: James So RPh.  [facility]  [phone]

## 2018-12-27 NOTE — Clinical Note
Routing message to PCP for review -BP checked at pharmacy and noted to be above goal. Recommended patient follow-up with PCP.Patient stated she has consistently took her meds the past 7 days but missed 12/24/18 dose.James So RPh.[phone]

## 2019-01-09 ENCOUNTER — TELEPHONE (OUTPATIENT)
Dept: FAMILY MEDICINE | Facility: CLINIC | Age: 59
End: 2019-01-09

## 2019-01-09 NOTE — TELEPHONE ENCOUNTER
Panel Management Review      Patient has the following on her problem list: None      Composite cancer screening  Chart review shows that this patient is due/due soon for the following None  Summary:    Patient is due/failing the following:   None needed     Action needed:   none    Type of outreach:    none needed    Questions for provider review:    None                                                                                                                                    Daksha Polk M.A.       Chart routed to n/a .

## 2019-01-14 ENCOUNTER — TELEPHONE (OUTPATIENT)
Dept: FAMILY MEDICINE | Facility: CLINIC | Age: 59
End: 2019-01-14

## 2019-01-14 DIAGNOSIS — F17.200 NICOTINE ADDICTION: Primary | ICD-10-CM

## 2019-01-14 RX ORDER — NICOTINE 21 MG/24HR
1 PATCH, TRANSDERMAL 24 HOURS TRANSDERMAL EVERY 24 HOURS
Qty: 90 PATCH | Refills: 2 | Status: SHIPPED | OUTPATIENT
Start: 2019-01-14 | End: 2019-10-28

## 2019-01-14 NOTE — TELEPHONE ENCOUNTER
Pt is requesting Step 2 of the nicotine patches. Please send over a new script for the Step 2 patches. Thanks!

## 2019-01-14 NOTE — TELEPHONE ENCOUNTER
Per verbal order read back Dr. Rigo Cerda:  Ok to sign the prescription for the nicotine 14mg/24hr patch; 90 day with refills.  Mellisa Cho RN

## 2019-03-01 ENCOUNTER — OFFICE VISIT (OUTPATIENT)
Dept: FAMILY MEDICINE | Facility: CLINIC | Age: 59
End: 2019-03-01
Payer: COMMERCIAL

## 2019-03-01 VITALS
HEART RATE: 84 BPM | WEIGHT: 203 LBS | HEIGHT: 64 IN | OXYGEN SATURATION: 97 % | DIASTOLIC BLOOD PRESSURE: 86 MMHG | SYSTOLIC BLOOD PRESSURE: 136 MMHG | BODY MASS INDEX: 34.66 KG/M2 | TEMPERATURE: 99.4 F

## 2019-03-01 DIAGNOSIS — J03.90 ACUTE TONSILLITIS, UNSPECIFIED ETIOLOGY: Primary | ICD-10-CM

## 2019-03-01 LAB
DEPRECATED S PYO AG THROAT QL EIA: NORMAL
SPECIMEN SOURCE: NORMAL

## 2019-03-01 PROCEDURE — 87880 STREP A ASSAY W/OPTIC: CPT | Performed by: FAMILY MEDICINE

## 2019-03-01 PROCEDURE — 99214 OFFICE O/P EST MOD 30 MIN: CPT | Performed by: FAMILY MEDICINE

## 2019-03-01 PROCEDURE — 87081 CULTURE SCREEN ONLY: CPT | Performed by: FAMILY MEDICINE

## 2019-03-01 RX ORDER — CLARITHROMYCIN 250 MG/1
250 TABLET, FILM COATED ORAL 2 TIMES DAILY
Qty: 20 TABLET | Refills: 0 | Status: SHIPPED | OUTPATIENT
Start: 2019-03-01 | End: 2019-03-11

## 2019-03-01 ASSESSMENT — PAIN SCALES - GENERAL: PAINLEVEL: SEVERE PAIN (6)

## 2019-03-01 ASSESSMENT — MIFFLIN-ST. JEOR: SCORE: 1480.8

## 2019-03-01 NOTE — PATIENT INSTRUCTIONS
================================================================================  Normal Values   Blood pressure  <140/90 for most adults    <130/80 for some chronic diseases (ask your care team about yours)    BMI (body mass index)  18.5-25 kg/m2 (based on height and weight)     Thank you for visiting [facility]    Normal or non-critical lab and imaging results will be communicated to you by MyChart, letter or phone within 7 days.  If you do not hear from us within 10 days, please call the clinic. If you have a critical or abnormal lab result, we will notify you by phone as soon as possible.     If you have any questions regarding your visit please contact:     Team Comfort:   Clinic Hours Telephone Number   Dr. Christopher Carpenter Dr. [place] 7am-5pm  Monday - Friday [phone]  Tram RN  Lacey RN  Mariah RN   Pharmacy 8:00am-8pm Monday-Friday    9am-5pm Saturday-Sunday [phone]   Urgent Care 11am-9pm Monday-Friday        9am-5pm Saturday-Sunday [phone]     After hours, weekend or if you need to make an appointment with your primary provider please call [phone].   After Hours nurse advise: call [facility]: [phone]    Medication Refills:  Call your pharmacy and they will forward the refill to us. Please allow 3 business days for your refills to be completed.

## 2019-03-01 NOTE — PROGRESS NOTES
"  SUBJECTIVE:   Kirstin Simmons is a 58 year old female who presents to clinic today for the following health issues:    ENT Symptoms             Symptoms: cc Present Absent Comment   Fever/Chills   x    Fatigue   x    Muscle Aches   x    Eye Irritation  x     Sneezing  x     Nasal Quentin/Drg  x     Sinus Pressure/Pain  x     Loss of smell   x    Dental pain  x     Sore Throat  x  Started first, Monday. Worst symptoms. White spots in throat developed last night.    Swollen Glands  x     Ear Pain/Fullness  x  Worst symptoms   Cough  x     Wheeze   x    Chest Pain   x    Shortness of breath   x    Rash   x    Other         Symptom duration:  about 4-5 days   Symptom severity:  moderate   Treatments tried: Decongestant, Advil Cold and Sinus. Ibuprofen   Contacts:  Her boss also has a sore throat, and is a known strep carrier.        Medications updated and reviewed.  Past, family and surgical history is updated and reviewed in the record.    ROS:  Other than noted above, general, HEENT, respiratory, cardiac and gastrointestinal systems are negative.    This document serves as a record of the services and decisions personally performed and made by Dr. Bean. It was created on his behalf by Jasmyn Suarez, a trained medical scribe. The creation of this document is based the provider's statements to the medical scribe.  Jasmyn Suarez March 1, 2019 10:06 AM    OBJECTIVE:                                                    /86   Pulse 84   Temp 99.4  F (37.4  C) (Oral)   Ht 1.626 m (5' 4\")   Wt 92.1 kg (203 lb)   SpO2 97%   BMI 34.84 kg/m     Body mass index is 34.84 kg/m .   GENERAL:  Alert, no acute distress  EYES:  PERRL, EOM normal, conjunctiva and lids normal  HEENT: right TM erythematous, left TM normal, ear canal: normal, nose no nasal discharge or congestion, throat/mouth:tonsillar hypertrophy right>left, marked erythema, exudates present right>left, mucous membranes moist  NECK: anterior cervical nodes tender  RESP:  " Lungs clear to auscultation bilaterally.   CV:  Normal rate, regular rhythm, no murmur or gallop.  ABDOMEN:  Soft, no organomegaly, masses or tenderness  MS:  extremities normal, no peripheral edema.  SKIN:  No suspicious lesions or rashes.  NEURO:  Alert, oriented, speech and mentation normal  PSYCH:  Mentation appears normal, affect and mood normal.    Diagnostic Test Results:  Results for orders placed or performed in visit on 03/01/19 (from the past 24 hour(s))   Strep, Rapid Screen   Result Value Ref Range    Specimen Description Throat     Rapid Strep A Screen       NEGATIVE: No Group A streptococcal antigen detected by immunoassay, await culture report.        ASSESSMENT/PLAN:                                                      (J03.90) Acute tonsillitis, unspecified etiology  (primary encounter diagnosis)  Comment: looks like Strep   Plan: clarithromycin (BIAXIN) 250 MG tablet        Return in about 10 days (around 3/11/2019) for if symptoms worsen or fail to resolve by then.       The information in this document, created by the medical scribe for me, accurately reflects the services I personally performed and the decisions made by me. I have reviewed and approved this document for accuracy prior to leaving the patient care area. March 1, 2019 9:51 AM     Christopher Bean MD

## 2019-03-01 NOTE — LETTER
March 1, 2019        Kirstin Simmons  [address] [facility] [address]          To whom it may concern:    RE: Kirstin Simmons    Patient was seen and treated today at our clinic and missed work. She may return Monday, 3/4/19.    Please contact me for questions or concerns.      Sincerely,        Christopher Bean MD

## 2019-03-02 LAB
BACTERIA SPEC CULT: NORMAL
SPECIMEN SOURCE: NORMAL

## 2019-03-06 ENCOUNTER — OFFICE VISIT (OUTPATIENT)
Dept: FAMILY MEDICINE | Facility: CLINIC | Age: 59
End: 2019-03-06
Payer: COMMERCIAL

## 2019-03-06 VITALS
DIASTOLIC BLOOD PRESSURE: 85 MMHG | WEIGHT: 201 LBS | OXYGEN SATURATION: 97 % | TEMPERATURE: 98.1 F | BODY MASS INDEX: 34.31 KG/M2 | RESPIRATION RATE: 18 BRPM | HEIGHT: 64 IN | HEART RATE: 83 BPM | SYSTOLIC BLOOD PRESSURE: 135 MMHG

## 2019-03-06 DIAGNOSIS — I10 ESSENTIAL HYPERTENSION: ICD-10-CM

## 2019-03-06 DIAGNOSIS — N94.10 DYSPAREUNIA IN FEMALE: ICD-10-CM

## 2019-03-06 DIAGNOSIS — Z12.11 SPECIAL SCREENING FOR MALIGNANT NEOPLASMS, COLON: Primary | ICD-10-CM

## 2019-03-06 PROCEDURE — 99214 OFFICE O/P EST MOD 30 MIN: CPT | Performed by: FAMILY MEDICINE

## 2019-03-06 RX ORDER — LISINOPRIL 5 MG/1
5 TABLET ORAL DAILY
Qty: 30 TABLET | Refills: 0 | Status: SHIPPED | OUTPATIENT
Start: 2019-03-06 | End: 2019-03-26

## 2019-03-06 ASSESSMENT — PAIN SCALES - GENERAL: PAINLEVEL: NO PAIN (0)

## 2019-03-06 ASSESSMENT — MIFFLIN-ST. JEOR: SCORE: 1471.73

## 2019-03-06 NOTE — PROGRESS NOTES
SUBJECTIVE:  58 year oldyear old female enters with  hypertension.  Pt. Has been compliant with medications and medications were reviewed.  No side effects. No chest pain or sob. Low sodium diet.    Current Outpatient Medications:      aspirin 81 MG tablet, Take 1 tablet (81 mg) by mouth daily, Disp: 90 tablet, Rfl: 3     atorvastatin (LIPITOR) 10 MG tablet, Take 1 tablet (10 mg) by mouth daily, Disp: 90 tablet, Rfl: 3     cetirizine (ZYRTEC) 10 MG tablet, Take 1 tablet by mouth daily., Disp: 90 tablet, Rfl: 3     clarithromycin (BIAXIN) 250 MG tablet, Take 1 tablet (250 mg) by mouth 2 times daily for 10 days , for throat infection, Disp: 20 tablet, Rfl: 0     lisinopril (PRINIVIL/ZESTRIL) 5 MG tablet, Take 1 tablet (5 mg) by mouth daily, Disp: 30 tablet, Rfl: 0     nicotine (NICODERM CQ) 14 MG/24HR 24 hr patch, Place 1 patch onto the skin every 24 hours, Disp: 90 patch, Rfl: 2     nicotine (NICODERM CQ) 7 MG/24HR 24 hr patch, Place 1 patch onto the skin every 24 hours, Disp: 90 patch, Rfl: 3  Past Medical History:   Diagnosis Date     Abdominal or pelvic swelling, mass or lump, unspecified site      Abnormal glandular Papanicolaou smear of cervix      Abnormal Pap smear 09/06/2006     Arthritis      Backache, unspecified      Cancer (H)     Grandmother, mother     Congestive heart failure (H)     Grandmother     Insomnia, unspecified      Other specified trigeminal nerve disorders      Thoracic or lumbosacral neuritis or radiculitis, unspecified      Thyroid disease     Daughter     Unspecified disorder of menstruation and other abnormal bleeding from female genital tract      Unspecified symptom associated with female genital organs      Viral warts, unspecified      Office Visit on 03/01/2019   Component Date Value Ref Range Status     Specimen Description 03/01/2019 Throat   Final     Rapid Strep A Screen 03/01/2019 NEGATIVE: No Group A streptococcal antigen detected by immunoassay, await culture report.   Final  "    Specimen Description 03/01/2019 Throat   Final     Culture Micro 03/01/2019 No beta hemolytic Streptococcus Group A isolated   Final      Reviewed health maintenance  Patient Active Problem List   Diagnosis     Abnormal Pap smear     CARDIOVASCULAR SCREENING; LDL GOAL LESS THAN 160     Menopause     S/P oophorectomy     Lumbar radiculopathy     Chronic rhinitis     Skin tag     Benign neoplasm of skin of trunk, except scrotum     Backache     Dry eye syndrome     High cholesterol     Skin lesion     Advanced directives, counseling/discussion         OBJECTIVE:  no apparent distress  /85   Pulse 83   Temp 98.1  F (36.7  C) (Oral)   Resp 18   Ht 1.626 m (5' 4\")   Wt 91.2 kg (201 lb)   SpO2 97%   BMI 34.50 kg/m       Head: Normocephalic. No masses, lesions, tenderness or abnormalities.  Neck::Neck supple. No adenopathy. Thyroid symmetric, normal size.    Cardiovascular: negative. No lifts, heaves, or thrills. RRR. No murmurs, clicks gallops or rubs  Respiratory. Good diaphragmatic excursion. Lungs clear  Gastrointestinal:Abdomen soft, non-tender.  No masses, organomegaly    Office Visit on 03/01/2019   Component Date Value Ref Range Status     Specimen Description 03/01/2019 Throat   Final     Rapid Strep A Screen 03/01/2019 NEGATIVE: No Group A streptococcal antigen detected by immunoassay, await culture report.   Final     Specimen Description 03/01/2019 Throat   Final     Culture Micro 03/01/2019 No beta hemolytic Streptococcus Group A isolated   Final         ICD-10-CM    1. Essential hypertension I10 lisinopril (PRINIVIL/ZESTRIL) 5 MG tablet    PLAN: Follow up in 6 months          Patient has had dyspareunia for 2 years and cannot take estrogen.     ICD-10-CM    1. Special screening for malignant neoplasms, colon Z12.11 Fecal colorectal cancer screen (FIT)   2. Essential hypertension I10 lisinopril (PRINIVIL/ZESTRIL) 5 MG tablet   3. Dyspareunia in female N94.10 OB/GYN REFERRAL    PLAN:await " Gynecology

## 2019-03-06 NOTE — NURSING NOTE
"Chief Complaint   Patient presents with     Hypertension       Initial /87   Pulse 83   Temp 98.1  F (36.7  C) (Oral)   Resp 18   Ht 1.626 m (5' 4\")   Wt 91.2 kg (201 lb)   SpO2 97%   BMI 34.50 kg/m   Estimated body mass index is 34.5 kg/m  as calculated from the following:    Height as of this encounter: 1.626 m (5' 4\").    Weight as of this encounter: 91.2 kg (201 lb).  Medication Reconciliation: complete  Daksha Polk M.A.    "

## 2019-03-07 ENCOUNTER — NURSE TRIAGE (OUTPATIENT)
Dept: NURSING | Facility: CLINIC | Age: 59
End: 2019-03-07

## 2019-03-08 NOTE — TELEPHONE ENCOUNTER
Patient reports she swallowed a pull tab from pop can accidentally.  Patient is speaking ok and is not having trouble with swallowing.  Reviewed guideline and care advice with caller.  Caller verbalizes understanding.      Reason for Disposition    Sharp object  (e.g., needle, nail, safety pin, toothpick, bone, bottle cap, pull tab, dental bridge work)     (Exception: tiny chips of glass generally pass without any symptoms)    Additional Information    Negative: Any difficulty breathing (e.g., coughing, wheezing or stridor)    Negative: Sounds like a life-threatening emergency to the triager    Negative: Choked on or inhaled food or foreign body (but did not swallow it)    Negative: Symptoms of blocked esophagus (e.g., can't swallow normal secretions, drooling)    Negative: Symptoms of FB stuck in throat or esophagus (e.g., continued pain in throat or chest, FB sensation, blood-tinged saliva) (Exception: pill stuck)    Negative: Can't swallow water or bread    Protocols used: SWALLOWED FOREIGN BODY-ADULT-

## 2019-09-17 ENCOUNTER — ALLIED HEALTH/NURSE VISIT (OUTPATIENT)
Dept: FAMILY MEDICINE | Facility: CLINIC | Age: 59
End: 2019-09-17
Payer: COMMERCIAL

## 2019-09-17 VITALS — HEART RATE: 72 BPM | DIASTOLIC BLOOD PRESSURE: 80 MMHG | SYSTOLIC BLOOD PRESSURE: 144 MMHG

## 2019-09-17 DIAGNOSIS — Z01.30 BP CHECK: Primary | ICD-10-CM

## 2019-09-17 PROCEDURE — 99207 ZZC NO CHARGE NURSE ONLY: CPT | Performed by: FAMILY MEDICINE

## 2019-09-17 NOTE — PROGRESS NOTES
Kirstin Simmons was evaluated at [facility] on September 17, 2019 at which time her blood pressure was:    BP Readings from Last 3 Encounters:   09/17/19 (!) 144/80   03/06/19 135/85   03/01/19 136/86     Pulse Readings from Last 3 Encounters:   09/17/19 72   03/06/19 83   03/01/19 84       Reviewed lifestyle modifications for blood pressure control and reduction: including making healthy food choices, managing weight, getting regular exercise, smoking cessation, reducing alcohol consumption, monitoring blood pressure regularly.     Symptoms: None    BP Goal:< 140/90 mmHg    BP Assessment:  BP too high    Potential Reasons for BP too high: NA - Not applicable    BP Follow-Up Plan: Recheck BP in 30 days at pharmacy    Recommendation to Provider: Patient is due for medication refill and provider appointment. BP is slightly high but almost to goal. Will address at next appointment.    Note completed by: Ila Sommer, PharmD  [facility]

## 2019-09-17 NOTE — Clinical Note
Patient is due for medication refill and provider appointment. BP is slightly high but almost to goal. Will address at next appointment. If further follow up is required please contact patient.

## 2019-09-26 DIAGNOSIS — I10 ESSENTIAL HYPERTENSION: ICD-10-CM

## 2019-09-26 RX ORDER — LISINOPRIL 5 MG/1
5 TABLET ORAL DAILY
Qty: 90 TABLET | Refills: 1 | Status: SHIPPED | OUTPATIENT
Start: 2019-09-26 | End: 2019-10-28

## 2019-09-26 NOTE — TELEPHONE ENCOUNTER
Routing refill request to provider for review/approval because:  Failed protocol.  Thank you. Sobeida Alexander R.N.

## 2019-10-09 ENCOUNTER — MYC REFILL (OUTPATIENT)
Dept: FAMILY MEDICINE | Facility: CLINIC | Age: 59
End: 2019-10-09

## 2019-10-09 DIAGNOSIS — E78.00 HIGH CHOLESTEROL: ICD-10-CM

## 2019-10-09 DIAGNOSIS — I10 ESSENTIAL HYPERTENSION: ICD-10-CM

## 2019-10-09 RX ORDER — LISINOPRIL 5 MG/1
TABLET ORAL
Qty: 90 TABLET | Refills: 1 | Status: SHIPPED | OUTPATIENT
Start: 2019-10-09 | End: 2019-10-28

## 2019-10-09 RX ORDER — ATORVASTATIN CALCIUM 10 MG/1
TABLET, FILM COATED ORAL
Qty: 30 TABLET | Refills: 0 | Status: SHIPPED | OUTPATIENT
Start: 2019-10-09 | End: 2019-10-28

## 2019-10-09 RX ORDER — ATORVASTATIN CALCIUM 10 MG/1
10 TABLET, FILM COATED ORAL DAILY
Qty: 30 TABLET | Refills: 0 | Status: CANCELLED | OUTPATIENT
Start: 2019-10-09

## 2019-10-09 RX ORDER — LISINOPRIL 5 MG/1
5 TABLET ORAL DAILY
Qty: 90 TABLET | Refills: 1 | Status: CANCELLED | OUTPATIENT
Start: 2019-10-09

## 2019-10-09 NOTE — TELEPHONE ENCOUNTER
Medication is being filled for 1 time refill only due to:  Patient needs to be seen for fasting lab appointment and appointment with the provider for further refills. Cholesterol and hypertension.  Teri JHAN, RN

## 2019-10-09 NOTE — LETTER
October 10, 2019    Kirstin Simmons  [address] [facility] [place] [address]    Dear Kirstin,       We recently received a refill request for atorvastatin and lisinopril.  We have refilled this for a one time supply only because you are due for a:    Cholesterol and hypertension office visit and fasting lab appointment      Please schedule this lab appointment 4-5 days prior to the office visit.     Please call at your earliest convenience so that there will not be a delay with your future refills.          Thank you,   Your [facility] Team/eb  [phone]

## 2019-10-17 ENCOUNTER — DOCUMENTATION ONLY (OUTPATIENT)
Dept: LAB | Facility: CLINIC | Age: 59
End: 2019-10-17

## 2019-10-17 DIAGNOSIS — Z13.6 CARDIOVASCULAR SCREENING; LDL GOAL LESS THAN 160: ICD-10-CM

## 2019-10-17 DIAGNOSIS — E78.00 HIGH CHOLESTEROL: Primary | ICD-10-CM

## 2019-10-17 NOTE — PROGRESS NOTES
Patient has an upcoming previsit appointment on 10/23/2019. Please review pended orders and add additional orders if needed.     Thank you,   Sherrie Hernandes

## 2019-10-18 NOTE — PROGRESS NOTES
Subjective     Kirstin Simmons is a 59 year old female who presents to clinic today for the following health issues:    HPI       Patient is here for medication check. Already has fasting labs.       Hyperlipidemia Follow-Up      Are you having any of the following symptoms? (Select all that apply)  Pain in calves when walking 1-2 blocks and No complaints of shortness of breath, chest pain or pressure.  No increased sweating or nausea with activity.  No left-sided neck or arm pain.  No complaints of pain in calves when walking 1-2 blocks.  She has a pinched nerve in left let. Are you regularly taking any medication or supplement to lower your cholesterol?   yes    Are you having muscle aches or other side effects that you think could be caused by your cholesterol lowering medication?  No    Obese patient.   Wants to establish.   Doesn't eat sweets per patient.     LDL Cholesterol Calculated   Date Value Ref Range Status   10/23/2019 86 <100 mg/dL Final     Comment:     Desirable:       <100 mg/dl     Triglycerides   Date Value Ref Range Status   10/23/2019 207 (H) <150 mg/dL Final     Comment:     Borderline high:  150-199 mg/dl  High:             200-499 mg/dl  Very high:       >499 mg/dl  Fasting specimen         Hypertension Follow-up      Do you check your blood pressure regularly outside of the clinic? No    Are you following a low salt diet? No    Are your blood pressures ever more than 140 on the top number (systolic) OR more   than 90 on the bottom number (diastolic), for example 140/90? No      How many servings of fruits and vegetables do you eat daily?  2-3    On average, how many sweetened beverages do you drink each day (soda, juice, sweet tea, etc)?   2-3 diet     How many days per week do you miss taking your medication? 0      No chest pain, shortness of breath, edema, PND, or orthopnea. No dizziness or vision changes. No side effects from medications. Blood pressure has been stable on  medication.    Creatinine   Date Value Ref Range Status   10/23/2019 0.88 0.52 - 1.04 mg/dL Final           Patient Active Problem List   Diagnosis     Abnormal Pap smear     CARDIOVASCULAR SCREENING; LDL GOAL LESS THAN 160     Menopause     S/P oophorectomy     Lumbar radiculopathy     Chronic rhinitis     Skin tag     Benign neoplasm of skin of trunk, except scrotum     Backache     Dry eye syndrome     High cholesterol     Skin lesion     Advanced directives, counseling/discussion     Past Surgical History:   Procedure Laterality Date     ABDOMEN SURGERY      Gall bladder removed. Fallopian tube removed     C OOPHORECTORMY FOR JALEEL, W/BX  2006    left ovary and fallopian tube, left salpingo-oophorectomy     CHOLECYSTECTOMY, LAPOROSCOPIC      Cholecystectomy, Laparoscopic       Social History     Tobacco Use     Smoking status: Former Smoker     Packs/day: 1.00     Years: 35.00     Pack years: 35.00     Types: Cigarettes     Last attempt to quit: 2014     Years since quittin.3     Smokeless tobacco: Never Used   Substance Use Topics     Alcohol use: Yes     Alcohol/week: 2.5 - 3.3 standard drinks     Comment: 5 x a month     Family History   Problem Relation Age of Onset     Breast Cancer Mother      Hypertension Mother      Breast Cancer Maternal Grandmother      Heart Disease Maternal Grandmother      Hypertension Maternal Grandmother          Current Outpatient Medications   Medication Sig Dispense Refill     aspirin 81 MG tablet Take 1 tablet (81 mg) by mouth daily 90 tablet 3     atorvastatin (LIPITOR) 10 MG tablet TAKE 1 TABLET BY MOUTH ONCE DAILY 30 tablet 0     cetirizine (ZYRTEC) 10 MG tablet Take 1 tablet by mouth daily. 90 tablet 3     lisinopril (PRINIVIL/ZESTRIL) 5 MG tablet TAKE 1 TABLET BY MOUTH ONCE DAILY 90 tablet 1     Allergies   Allergen Reactions     Penicillins Hives     Ceclor [Cefaclor] Hives     Flonase [Fluticasone] Nausea     Latex      Morphine GI Disturbance      "Olopatadine Other (See Comments)     Swelling      Omnicef [Cefdinir] Nausea and Vomiting     Perfume      Eggs GI Disturbance     Reviewed and updated as needed this visit by Provider         Review of Systems   ROS COMP: Constitutional, HEENT, cardiovascular, pulmonary, GI, , musculoskeletal, neuro, skin, endocrine and psych systems are negative, except as otherwise noted.      Objective    /77   Pulse 89   Temp 98.2  F (36.8  C) (Oral)   Resp 18   Ht 1.613 m (5' 3.5\")   Wt 90.7 kg (200 lb)   BMI 34.87 kg/m    There is no height or weight on file to calculate BMI.  Physical Exam   GENERAL: alert, no distress and obese  RESP: lungs clear to auscultation - no rales, rhonchi or wheezes  CV: regular rate and rhythm, normal S1 S2, no S3 or S4, no murmur, click or rub, no peripheral edema and peripheral pulses strong  MS: no gross musculoskeletal defects noted, no edema  SKIN: no suspicious lesions or rashes  NEURO: Normal strength and tone, mentation intact and speech normal  PSYCH: mentation appears normal, affect normal/bright            Assessment & Plan     1. Essential hypertension  stable  - lisinopril (PRINIVIL/ZESTRIL) 5 MG tablet; Take 1 tablet (5 mg) by mouth daily  Dispense: 90 tablet; Refill: 3    2. High cholesterol  Stable  Needs to lose weight TGs should go down then  If TGs increase more then we can increase lipitor    - atorvastatin (LIPITOR) 10 MG tablet; Take 1 tablet (10 mg) by mouth daily  Dispense: 90 tablet; Refill: 3     BMI:   Estimated body mass index is 34.87 kg/m  as calculated from the following:    Height as of this encounter: 1.613 m (5' 3.5\").    Weight as of this encounter: 90.7 kg (200 lb).   Weight management plan: Discussed healthy diet and exercise guidelines    Patient Instructions   Recheck 1 year sooner if needed    Please return colon cancer screening kit once they mail it out to you            Return in about 1 year (around 10/28/2020) for med recheck.    Eugenie " Gracie Marroquin PA-C  [facility]

## 2019-10-23 DIAGNOSIS — Z13.6 CARDIOVASCULAR SCREENING; LDL GOAL LESS THAN 160: ICD-10-CM

## 2019-10-23 DIAGNOSIS — E78.00 HIGH CHOLESTEROL: ICD-10-CM

## 2019-10-23 LAB
ANION GAP SERPL CALCULATED.3IONS-SCNC: 5 MMOL/L (ref 3–14)
BUN SERPL-MCNC: 14 MG/DL (ref 7–30)
CALCIUM SERPL-MCNC: 9.4 MG/DL (ref 8.5–10.1)
CHLORIDE SERPL-SCNC: 105 MMOL/L (ref 94–109)
CHOLEST SERPL-MCNC: 165 MG/DL
CO2 SERPL-SCNC: 30 MMOL/L (ref 20–32)
CREAT SERPL-MCNC: 0.88 MG/DL (ref 0.52–1.04)
GFR SERPL CREATININE-BSD FRML MDRD: 71 ML/MIN/{1.73_M2}
GLUCOSE SERPL-MCNC: 97 MG/DL (ref 70–99)
HDLC SERPL-MCNC: 38 MG/DL
LDLC SERPL CALC-MCNC: 86 MG/DL
NONHDLC SERPL-MCNC: 127 MG/DL
POTASSIUM SERPL-SCNC: 4.1 MMOL/L (ref 3.4–5.3)
SODIUM SERPL-SCNC: 140 MMOL/L (ref 133–144)
TRIGL SERPL-MCNC: 207 MG/DL

## 2019-10-23 PROCEDURE — 80048 BASIC METABOLIC PNL TOTAL CA: CPT | Performed by: PHYSICIAN ASSISTANT

## 2019-10-23 PROCEDURE — 80061 LIPID PANEL: CPT | Performed by: PHYSICIAN ASSISTANT

## 2019-10-23 PROCEDURE — 36415 COLL VENOUS BLD VENIPUNCTURE: CPT | Performed by: PHYSICIAN ASSISTANT

## 2019-10-24 NOTE — RESULT ENCOUNTER NOTE
Delvis Fulton,       Your recent test results are attached, if you have any questions or concerns please feel free to contact me via e-mail or call [phone].  Cholesterol is worsened some.  We will discuss at upcoming office visit.      Sincerely,  Eugenie Marroquin PA-C

## 2019-10-28 ENCOUNTER — OFFICE VISIT (OUTPATIENT)
Dept: FAMILY MEDICINE | Facility: CLINIC | Age: 59
End: 2019-10-28
Payer: COMMERCIAL

## 2019-10-28 VITALS
DIASTOLIC BLOOD PRESSURE: 77 MMHG | BODY MASS INDEX: 34.15 KG/M2 | HEART RATE: 89 BPM | HEIGHT: 64 IN | WEIGHT: 200 LBS | SYSTOLIC BLOOD PRESSURE: 128 MMHG | RESPIRATION RATE: 18 BRPM | TEMPERATURE: 98.2 F

## 2019-10-28 DIAGNOSIS — E78.00 HIGH CHOLESTEROL: ICD-10-CM

## 2019-10-28 DIAGNOSIS — I10 ESSENTIAL HYPERTENSION: Primary | ICD-10-CM

## 2019-10-28 PROCEDURE — 99214 OFFICE O/P EST MOD 30 MIN: CPT | Performed by: PHYSICIAN ASSISTANT

## 2019-10-28 RX ORDER — ATORVASTATIN CALCIUM 10 MG/1
10 TABLET, FILM COATED ORAL DAILY
Qty: 90 TABLET | Refills: 3 | Status: SHIPPED | OUTPATIENT
Start: 2019-10-28 | End: 2020-11-02

## 2019-10-28 RX ORDER — LISINOPRIL 5 MG/1
5 TABLET ORAL DAILY
Qty: 90 TABLET | Refills: 3 | Status: SHIPPED | OUTPATIENT
Start: 2019-10-28 | End: 2020-11-02

## 2019-10-28 ASSESSMENT — MIFFLIN-ST. JEOR: SCORE: 1454.25

## 2019-10-28 NOTE — PATIENT INSTRUCTIONS
Recheck 1 year sooner if needed    Please return colon cancer screening kit once they mail it out to you

## 2019-10-28 NOTE — NURSING NOTE
"Chief Complaint   Patient presents with     Health Maintenance     orders pended     Hypertension     Lipids       Initial /77   Pulse 89   Temp 98.2  F (36.8  C) (Oral)   Resp 18   Ht 1.613 m (5' 3.5\")   Wt 90.7 kg (200 lb)   BMI 34.87 kg/m   Estimated body mass index is 34.87 kg/m  as calculated from the following:    Height as of this encounter: 1.613 m (5' 3.5\").    Weight as of this encounter: 90.7 kg (200 lb).    Teri Miller CMA    "

## 2019-12-06 ENCOUNTER — ANCILLARY PROCEDURE (OUTPATIENT)
Dept: MAMMOGRAPHY | Facility: CLINIC | Age: 59
End: 2019-12-06
Attending: PHYSICIAN ASSISTANT
Payer: COMMERCIAL

## 2019-12-06 DIAGNOSIS — Z12.31 VISIT FOR SCREENING MAMMOGRAM: ICD-10-CM

## 2019-12-06 PROCEDURE — 77067 SCR MAMMO BI INCL CAD: CPT | Mod: TC

## 2020-02-23 ENCOUNTER — HEALTH MAINTENANCE LETTER (OUTPATIENT)
Age: 60
End: 2020-02-23

## 2020-02-26 DIAGNOSIS — Z12.11 SPECIAL SCREENING FOR MALIGNANT NEOPLASMS, COLON: Primary | ICD-10-CM

## 2020-02-27 ENCOUNTER — HOSPITAL ENCOUNTER (OUTPATIENT)
Facility: AMBULATORY SURGERY CENTER | Age: 60
End: 2020-02-27
Attending: SURGERY | Admitting: SURGERY
Payer: COMMERCIAL

## 2020-11-02 ENCOUNTER — MYC MEDICAL ADVICE (OUTPATIENT)
Dept: FAMILY MEDICINE | Facility: CLINIC | Age: 60
End: 2020-11-02

## 2020-11-02 DIAGNOSIS — E78.00 HIGH CHOLESTEROL: ICD-10-CM

## 2020-11-02 DIAGNOSIS — I10 ESSENTIAL HYPERTENSION: ICD-10-CM

## 2020-11-02 RX ORDER — ATORVASTATIN CALCIUM 10 MG/1
10 TABLET, FILM COATED ORAL DAILY
Qty: 90 TABLET | Refills: 0 | Status: SHIPPED | OUTPATIENT
Start: 2020-11-02 | End: 2020-11-05

## 2020-11-02 RX ORDER — LISINOPRIL 5 MG/1
5 TABLET ORAL DAILY
Qty: 90 TABLET | Refills: 0 | Status: SHIPPED | OUTPATIENT
Start: 2020-11-02 | End: 2020-11-05

## 2020-11-02 NOTE — TELEPHONE ENCOUNTER
Please review message.  I have already sent a refill.  Are you ok with a virtual appointment.  Teri JHAN, RN

## 2020-11-02 NOTE — TELEPHONE ENCOUNTER
Yes we can do virtual. Then she will have to do labs when she is no longer sick.     Eugenie Marroquin PA-C

## 2020-11-02 NOTE — PROGRESS NOTES
"Kirstin Simmons is a 60 year old female who is being evaluated via a billable telephone visit.      The patient has been notified of following:     \"This telephone visit will be conducted via a call between you and your physician/provider. We have found that certain health care needs can be provided without the need for a physical exam.  This service lets us provide the care you need with a short phone conversation.  If a prescription is necessary we can send it directly to your pharmacy.  If lab work is needed we can place an order for that and you can then stop by our lab to have the test done at a later time.    Telephone visits are billed at different rates depending on your insurance coverage. During this emergency period, for some insurers they may be billed the same as an in-person visit.  Please reach out to your insurance provider with any questions.    If during the course of the call the physician/provider feels a telephone visit is not appropriate, you will not be charged for this service.\"    Patient has given verbal consent for Telephone visit?  Yes    What phone number would you like to be contacted at? [phone]    How would you like to obtain your AVS? Gala Mcarthur     Kirstin Simmons is a 60 year old female who presents via phone visit today for the following health issues:    Patient was ? Exposed to covid so didn't want to come into clinic.   Has not been seen in a year.   Not leaving house much.       HPI    Hyperlipidemia Follow-Up      Are you regularly taking any medication or supplement to lower your cholesterol?   Yes- Atorvastatin    Are you having muscle aches or other side effects that you think could be caused by your cholesterol lowering medication?  No    Hypertension Follow-up      Do you check your blood pressure regularly outside of the clinic? No     Are you following a low salt diet? Yes    Are your blood pressures ever more than 140 on the top number (systolic) OR " more   than 90 on the bottom number (diastolic), for example 140/90? Pt is not checking BP      How many servings of fruits and vegetables do you eat daily?  0-1    On average, how many sweetened beverages do you drink each day (Examples: soda, juice, sweet tea, etc.  Do NOT count diet or artificially sweetened beverages)?   0    How many days per week do you miss taking your medication? 0    No chest pain, shortness of breath, edema, PND, or orthopnea. No dizziness or vision changes. No side effects from medications. Blood pressure has been stable on medication.            Review of Systems   Constitutional, HEENT, cardiovascular, pulmonary, GI, , musculoskeletal, neuro, skin, endocrine and psych systems are negative, except as otherwise noted.       Objective          Vitals:  No vitals were obtained today due to virtual visit.    healthy, alert and no distress  PSYCH: Alert and oriented times 3; coherent speech, normal   rate and volume, able to articulate logical thoughts, able   to abstract reason, no tangential thoughts, no hallucinations   or delusions  Her affect is normal  RESP: No cough, no audible wheezing, able to talk in full sentences  Remainder of exam unable to be completed due to telephone visits            Assessment/Plan:    Assessment & Plan         Screening for hyperlipidemia    - Lipid panel reflex to direct LDL Fasting; Future    High cholesterol    - atorvastatin (LIPITOR) 10 MG tablet; Take 1 tablet (10 mg) by mouth daily    Essential hypertension  See below  - **Comprehensive metabolic panel FUTURE anytime; Future  - lisinopril (ZESTRIL) 5 MG tablet; Take 1 tablet (5 mg) by mouth daily     Patient Instructions   Please return fasting for cholesterol and diabetes screening, you can make a lab only appointment for this.  No food or drink other than water for 10 hours.    Make ancillary blood pressure check so we can get this blood pressure documented    Come in for a physical as soon as  possible      Return in about 3 months (around 2/5/2021) for Physical Exam.    Eugenie Marroquin PA-C  [facility]    Phone call duration:  6 minutes

## 2020-11-05 ENCOUNTER — VIRTUAL VISIT (OUTPATIENT)
Dept: FAMILY MEDICINE | Facility: CLINIC | Age: 60
End: 2020-11-05
Payer: COMMERCIAL

## 2020-11-05 DIAGNOSIS — Z13.220 SCREENING FOR HYPERLIPIDEMIA: ICD-10-CM

## 2020-11-05 DIAGNOSIS — I10 ESSENTIAL HYPERTENSION: Primary | ICD-10-CM

## 2020-11-05 DIAGNOSIS — E78.00 HIGH CHOLESTEROL: ICD-10-CM

## 2020-11-05 PROCEDURE — 99214 OFFICE O/P EST MOD 30 MIN: CPT | Mod: TEL | Performed by: PHYSICIAN ASSISTANT

## 2020-11-05 RX ORDER — LISINOPRIL 5 MG/1
5 TABLET ORAL DAILY
Qty: 90 TABLET | Refills: 1 | Status: SHIPPED | OUTPATIENT
Start: 2020-11-05 | End: 2021-08-12

## 2020-11-05 RX ORDER — ATORVASTATIN CALCIUM 10 MG/1
10 TABLET, FILM COATED ORAL DAILY
Qty: 90 TABLET | Refills: 1 | Status: SHIPPED | OUTPATIENT
Start: 2020-11-05 | End: 2021-08-12

## 2020-11-05 NOTE — PATIENT INSTRUCTIONS
Please return fasting for cholesterol and diabetes screening, you can make a lab only appointment for this.  No food or drink other than water for 10 hours.    Make ancillary blood pressure check so we can get this blood pressure documented    Come in for a physical as soon as possible

## 2020-11-09 ENCOUNTER — ALLIED HEALTH/NURSE VISIT (OUTPATIENT)
Dept: NURSING | Facility: CLINIC | Age: 60
End: 2020-11-09
Payer: COMMERCIAL

## 2020-11-09 VITALS — DIASTOLIC BLOOD PRESSURE: 84 MMHG | HEART RATE: 76 BPM | SYSTOLIC BLOOD PRESSURE: 122 MMHG

## 2020-11-09 DIAGNOSIS — Z01.30 BP CHECK: Primary | ICD-10-CM

## 2020-11-09 PROCEDURE — 99207 PR NO CHARGE NURSE ONLY: CPT

## 2020-11-09 NOTE — PROGRESS NOTES
Kirstin Simmons is a 60 year old patient who comes in today for a Blood Pressure check.  Initial BP:  /84   Pulse 76        Disposition: results routed to provider

## 2020-12-03 DIAGNOSIS — I10 ESSENTIAL HYPERTENSION: ICD-10-CM

## 2020-12-03 DIAGNOSIS — Z13.220 SCREENING FOR HYPERLIPIDEMIA: ICD-10-CM

## 2020-12-03 DIAGNOSIS — R05.9 COUGH: ICD-10-CM

## 2020-12-03 LAB
ALBUMIN SERPL-MCNC: 3.7 G/DL (ref 3.4–5)
ALP SERPL-CCNC: 101 U/L (ref 40–150)
ALT SERPL W P-5'-P-CCNC: 26 U/L (ref 0–50)
ANION GAP SERPL CALCULATED.3IONS-SCNC: 5 MMOL/L (ref 3–14)
AST SERPL W P-5'-P-CCNC: 13 U/L (ref 0–45)
BILIRUB SERPL-MCNC: 0.3 MG/DL (ref 0.2–1.3)
BUN SERPL-MCNC: 11 MG/DL (ref 7–30)
CALCIUM SERPL-MCNC: 9 MG/DL (ref 8.5–10.1)
CHLORIDE SERPL-SCNC: 106 MMOL/L (ref 94–109)
CHOLEST SERPL-MCNC: 166 MG/DL
CO2 SERPL-SCNC: 29 MMOL/L (ref 20–32)
CREAT SERPL-MCNC: 0.76 MG/DL (ref 0.52–1.04)
GFR SERPL CREATININE-BSD FRML MDRD: 85 ML/MIN/{1.73_M2}
GLUCOSE SERPL-MCNC: 94 MG/DL (ref 70–99)
HDLC SERPL-MCNC: 41 MG/DL
LDLC SERPL CALC-MCNC: 94 MG/DL
NONHDLC SERPL-MCNC: 125 MG/DL
POTASSIUM SERPL-SCNC: 4 MMOL/L (ref 3.4–5.3)
PROT SERPL-MCNC: 7 G/DL (ref 6.8–8.8)
SODIUM SERPL-SCNC: 140 MMOL/L (ref 133–144)
TRIGL SERPL-MCNC: 153 MG/DL

## 2020-12-03 PROCEDURE — 80053 COMPREHEN METABOLIC PANEL: CPT | Performed by: PHYSICIAN ASSISTANT

## 2020-12-03 PROCEDURE — 36415 COLL VENOUS BLD VENIPUNCTURE: CPT | Performed by: PHYSICIAN ASSISTANT

## 2020-12-03 PROCEDURE — 80061 LIPID PANEL: CPT | Performed by: PHYSICIAN ASSISTANT

## 2020-12-03 PROCEDURE — 86769 SARS-COV-2 COVID-19 ANTIBODY: CPT | Performed by: PHYSICIAN ASSISTANT

## 2020-12-04 LAB
COVID-19 ANTIBODY IGG: NEGATIVE
LAB TEST METHOD: NORMAL

## 2020-12-04 NOTE — RESULT ENCOUNTER NOTE
Delvis Fulton,       Your recent test results are attached, if you have any questions or concerns please feel free to contact me via e-mail or call [phone].  Negative:covid antibody test.  Cholesterol has improved. Sodium and potassium normal. Blood sugar (glucose) normal.  Creatinine and GFR normal, which means kidney function is normal.   Liver test normal.       It was a pleasure to see you at your recent office visit.      Sincerely,  Eugenie Marroquin PA-C

## 2021-01-18 ENCOUNTER — MYC MEDICAL ADVICE (OUTPATIENT)
Dept: FAMILY MEDICINE | Facility: CLINIC | Age: 61
End: 2021-01-18

## 2021-04-17 ENCOUNTER — HEALTH MAINTENANCE LETTER (OUTPATIENT)
Age: 61
End: 2021-04-17

## 2021-08-03 NOTE — PROGRESS NOTES
Kirstin is a 60 year old who is being evaluated via a billable video visit.      How would you like to obtain your AVS? MyChart  If the video visit is dropped, the invitation should be resent by: Text to cell phone: [phone]  Will anyone else be joining your video visit? No    Video Start Time: 10:32 AM    Assessment & Plan     Essential hypertension  Unsure of recent blood pressure, see below  Historically done well on this medication  Labs per below (4 months) fasting  - Basic metabolic panel  (Ca, Cl, CO2, Creat, Gluc, K, Na, BUN); Future  - lisinopril (ZESTRIL) 5 MG tablet; Take 1 tablet (5 mg) by mouth daily    High cholesterol  I will follow-up with labs in future but has been historically good    - Lipid panel reflex to direct LDL Fasting; Future  - atorvastatin (LIPITOR) 10 MG tablet; Take 1 tablet (10 mg) by mouth daily    Screen for colon cancer  Discussed in detail. Ordered.  - COLOGUARD([employer])    Patient Instructions   Send me a Timehop message with some home blood pressure readings once you get a blood pressure machine    Recheck in person one year, sooner if needed            Return in about 4 months (around 12/12/2021) for fasting Lab Work.    Eugenie Marroquin PA-C  [facility]   Kirstin is a 60 year old who presents for the following health issues  accompanied by her Self:    HPI       Colon cancer screening due.   Had it scheduled but cancelled due to covid.  Has tried at home kit but found it difficult.     Ok to try cologuard again per patient. Didn't receive it in the mail before which was frustrating, she was supposed to.  I will order.      Hyperlipidemia Follow-Up      Are you regularly taking any medication or supplement to lower your cholesterol?   Yes- Atorvastatin    Are you having muscle aches or other side effects that you think could be caused by your cholesterol lowering medication?  No    Due for labs in dec. Last labs looked  great.      LDL Cholesterol Calculated   Date Value Ref Range Status   12/03/2020 94 <100 mg/dL Final     Comment:     Desirable:       <100 mg/dl       Hypertension Follow-up      Do you check your blood pressure regularly outside of the clinic? No     Are you following a low salt diet? Yes    Are your blood pressures ever more than 140 on the top number (systolic) OR more   than 90 on the bottom number (diastolic), for example 140/90? Pt is not checking.        How many days per week do you miss taking your medication? 0    No chest pain, shortness of breath, edema, PND, or orthopnea. No dizziness or vision changes. No side effects from medications. Blood pressure has been unknown but last nursing reading was good here last fall.  Will buy a cuff at home and start monitoring,          Review of Systems   Constitutional, HEENT, cardiovascular, pulmonary, GI, , musculoskeletal, neuro, skin, endocrine and psych systems are negative, except as otherwise noted.      Objective           Vitals:  No vitals were obtained today due to virtual visit.    Physical Exam   GENERAL: Healthy, alert and no distress  EYES: Eyes grossly normal to inspection.  No discharge or erythema, or obvious scleral/conjunctival abnormalities.  RESP: No audible wheeze, cough, or visible cyanosis.  No visible retractions or increased work of breathing.    SKIN: Visible skin clear. No significant rash, abnormal pigmentation or lesions.  NEURO: Cranial nerves grossly intact.  Mentation and speech appropriate for age.  PSYCH: Mentation appears normal, affect normal/bright, judgement and insight intact, normal speech and appearance well-groomed.        Video-Visit Details    Type of service:  Video Visit    Video End Time:10:46 AM    Originating Location (pt. Location): Home    Distant Location (provider location):  [facility]     Platform used for Video Visit: Adyuka

## 2021-08-12 ENCOUNTER — MYC MEDICAL ADVICE (OUTPATIENT)
Dept: FAMILY MEDICINE | Facility: CLINIC | Age: 61
End: 2021-08-12

## 2021-08-12 ENCOUNTER — VIRTUAL VISIT (OUTPATIENT)
Dept: FAMILY MEDICINE | Facility: CLINIC | Age: 61
End: 2021-08-12
Payer: COMMERCIAL

## 2021-08-12 DIAGNOSIS — Z12.11 SCREEN FOR COLON CANCER: ICD-10-CM

## 2021-08-12 DIAGNOSIS — I10 ESSENTIAL HYPERTENSION: Primary | ICD-10-CM

## 2021-08-12 DIAGNOSIS — E78.00 HIGH CHOLESTEROL: ICD-10-CM

## 2021-08-12 PROCEDURE — 99214 OFFICE O/P EST MOD 30 MIN: CPT | Mod: 95 | Performed by: PHYSICIAN ASSISTANT

## 2021-08-12 RX ORDER — ATORVASTATIN CALCIUM 10 MG/1
10 TABLET, FILM COATED ORAL DAILY
Qty: 90 TABLET | Refills: 3 | Status: SHIPPED | OUTPATIENT
Start: 2021-08-12 | End: 2022-08-01

## 2021-08-12 RX ORDER — LISINOPRIL 5 MG/1
5 TABLET ORAL DAILY
Qty: 90 TABLET | Refills: 3 | Status: SHIPPED | OUTPATIENT
Start: 2021-08-12 | End: 2022-08-01

## 2021-08-12 NOTE — PATIENT INSTRUCTIONS
Send me a Consign[employer] message with some home blood pressure readings once you get a blood pressure machine    Recheck in person one year, sooner if needed

## 2021-09-26 ENCOUNTER — HEALTH MAINTENANCE LETTER (OUTPATIENT)
Age: 61
End: 2021-09-26

## 2021-10-29 ENCOUNTER — TRANSFERRED RECORDS (OUTPATIENT)
Dept: HEALTH INFORMATION MANAGEMENT | Facility: CLINIC | Age: 61
End: 2021-10-29
Payer: COMMERCIAL

## 2021-10-29 LAB — COLOGUARD-ABSTRACT: POSITIVE

## 2021-10-30 ENCOUNTER — MYC MEDICAL ADVICE (OUTPATIENT)
Dept: FAMILY MEDICINE | Facility: CLINIC | Age: 61
End: 2021-10-30
Payer: COMMERCIAL

## 2021-11-15 ENCOUNTER — TELEPHONE (OUTPATIENT)
Dept: FAMILY MEDICINE | Facility: CLINIC | Age: 61
End: 2021-11-15
Payer: COMMERCIAL

## 2021-11-15 DIAGNOSIS — R19.5 POSITIVE COLORECTAL CANCER SCREENING USING COLOGUARD TEST: Primary | ICD-10-CM

## 2021-11-15 NOTE — TELEPHONE ENCOUNTER
Did we get the copy yet? I would like to see it before having patient called, thank you! Please route back to me, Eugenie

## 2021-11-15 NOTE — TELEPHONE ENCOUNTER
[employer] is calling with positive Cologuard results. They will fax a copy to us.Lynda Benz MA/GENARO

## 2021-11-18 ENCOUNTER — HOSPITAL ENCOUNTER (OUTPATIENT)
Facility: AMBULATORY SURGERY CENTER | Age: 61
End: 2021-11-18
Attending: INTERNAL MEDICINE
Payer: COMMERCIAL

## 2021-11-18 ENCOUNTER — TELEPHONE (OUTPATIENT)
Dept: GASTROENTEROLOGY | Facility: CLINIC | Age: 61
End: 2021-11-18
Payer: COMMERCIAL

## 2021-11-18 NOTE — TELEPHONE ENCOUNTER
PLEASE CALL PATIENT:     Positive cologuard. Needs colonoscopy. Can get false positive but need to rule out colon cancer with scope.   Ordered.     Eugenie Marroquin PA-C

## 2021-11-18 NOTE — TELEPHONE ENCOUNTER
Patient called back and below provider message was read to her.   She understands this.  I gave the patient [facility] scheduling number so she can call to make appointment.     Maria L JHAN, RN

## 2021-11-18 NOTE — TELEPHONE ENCOUNTER
Screening Questions  1. Are you active on mychart? Y    2. What insurance is in the chart? [employer]    2.  Ordering/Referring Provider: Cara    3. BMI 36.0    4.  Respiratory Screening:     Do you use daily home oxygen? N  Do you have mod to severe Obstructive Sleep Apnea? N   Do you have Pulmonary Hypertension? N   Do you have SEVERE asthma? N    5. Have you had a heart or lung transplant? N    6. Are you currently on dialysis or have chronic kidney disease? N    7. Have you had a stroke or Transient ischemic attack (TIA) within 6 months? N    8. In the past 6 months, have you had any heart related issues including cardiomyopathy or heart attack? N      If yes, did it require cardiac stenting or other implantable device?N      9. Do you have any implantable devices in your body (pacemaker, defib, LVAD)? N    10. Do you take nitroglycerin? If yes, how often? N    11. Are you currently taking any blood thinners?ASA Daily    12. Are you a diabetic? N    13. (Females) Are you currently pregnant? NA  If yes, how many weeks?      15. Are you taking any prescription pain medications on a routine schedule? N If yes, MAC sedation.    16. Do you have any chemical dependencies such as alcohol, street drugs, or methadone? NIf yes, MAC sedation.    17. Do you have any history of post-traumatic stress syndrome, severe anxiety or history of psychosis? N    18. Do you transfer independently? Yes    19.  Do you have any issues with constipation? N    20. Preferred Pharmacy for Pre Prescription [facility] - [place], [place] - [address] [place] [address]    Scheduling Details    Which Colonoscopy Prep was Sent?: Miralax  Procedure Scheduled: Colon  Surgeon: Cece  Date of Procedure: 1/12/22  Location:   Caller (Please ask for phone number if not scheduled by patient): Kirstin      Sedation Type: MAC  Conscious Sedation- Needs  for 6 hours after the procedure  MAC/General-Needs  for 24 hours after  procedure    Pre-op Required at [facility], [place], [place] and OR for MAC sedation:   (if yes advise patient they will need a pre-op prior to procedure)      Is patient on blood thinners? -N (If yes- inform patient to follow up with PCP or provider for follow up instructions)     Informed patient they will need an adult  Yes  Cannot take any type of public or medical transportation alone    Pre-Procedure Covid test to be completed at [facility] or Externally: [place] 1/9/22    Confirmed Nurse will call to complete assessment Yes    Additional comments:

## 2021-11-18 NOTE — TELEPHONE ENCOUNTER
"Left message on answering machine for patient to call back to speak with a clinic RN at [phone].  Mellisa Cho, RN    When calls back please relay provider's note/instructions as per below.  The scheduling number for the colonoscopy: \" If you have not heard from the scheduling office within 2 business days, please call [phone]\"   "

## 2021-11-19 ENCOUNTER — TELEPHONE (OUTPATIENT)
Dept: GASTROENTEROLOGY | Facility: CLINIC | Age: 61
End: 2021-11-19
Payer: COMMERCIAL

## 2021-11-19 ENCOUNTER — MYC MEDICAL ADVICE (OUTPATIENT)
Dept: FAMILY MEDICINE | Facility: CLINIC | Age: 61
End: 2021-11-19
Payer: COMMERCIAL

## 2021-11-19 DIAGNOSIS — Z11.59 ENCOUNTER FOR SCREENING FOR OTHER VIRAL DISEASES: ICD-10-CM

## 2021-11-19 NOTE — TELEPHONE ENCOUNTER
Caller: negra    Procedure: colon    Date, Location, and Surgeon of Procedure Cancelled: 1/12 mg lopez    Ordering Provider:Eugenie Marroquin PA-C    Reason for cancel (please be detailed, any staff messages or encounters to note?): due to health she wanted to get in before January         Rescheduled: y     If rescheduled:    Date: 12/3   Location: [facility]   Note any change or update to original order/sedation: gaertner

## 2021-11-19 NOTE — TELEPHONE ENCOUNTER
Caller: Kirstin     Procedure: colonoscopy     Date, Location, and Surgeon of Procedure Cancelled: 12/01/2021 at [facility] with Dr. Berumen    Ordering Provider:Eugenie Marroquin PA-C in [facility]    Reason for cancel (please be detailed, any staff messages or encounters to note?): Patient was scheduled for the wrong day        Rescheduled: yes     If rescheduled:    Date: 12/03/2021   Location: [facility]   Note any change or update to original order/sedation: NO

## 2021-11-23 ENCOUNTER — TELEPHONE (OUTPATIENT)
Dept: GASTROENTEROLOGY | Facility: CLINIC | Age: 61
End: 2021-11-23
Payer: COMMERCIAL

## 2021-11-23 ENCOUNTER — TELEPHONE (OUTPATIENT)
Dept: FAMILY MEDICINE | Facility: CLINIC | Age: 61
End: 2021-11-23
Payer: COMMERCIAL

## 2021-11-23 DIAGNOSIS — R19.5 POSITIVE COLORECTAL CANCER SCREENING USING COLOGUARD TEST: Primary | ICD-10-CM

## 2021-11-23 NOTE — TELEPHONE ENCOUNTER
Patient needs to have a colonoscopy as her cologuard was positive.  Please, inform of this result.  A colonoscopy has been ordered

## 2021-11-23 NOTE — TELEPHONE ENCOUNTER
Attempted to contact patient regarding upcoming colonoscopy procedure on 12.3.2021 for pre assessment questions. No answer.     Left message to return call to [phone] #2    Covid test scheduled: 11.30.2021    Arrival time: 0930    Facility location: [facility]    Sedation type: MAC    Indication for procedure: + FIT    Referring provider: Rigo Cerda MD    Bowel prep recommendation: miralax and magnesium citrate prep    Cassie Turner RN

## 2021-11-23 NOTE — TELEPHONE ENCOUNTER
Left message on answering machine for patient to call back and ask to speak with RN as per her PCP's instructions at [phone].  Mellisa Cho RN    RN:  When patient calls back please read her Dr. Rigo Cerda's note/instructions below and give her scheduling information for colonoscopy appointment.  Mellisa Cho RN   per referral for colonoscopy:  [facility]; scheduling number [phone] to schedule the colonoscopy.  Mellisa Cho RN

## 2021-11-23 NOTE — TELEPHONE ENCOUNTER
Patient called back and below message from provider was given to her.   Patient was already notified of this result 11/15/21 and has her colonoscopy is already scheduled.     Maria L JHAN, RN

## 2021-11-24 NOTE — TELEPHONE ENCOUNTER
Pre assessment questions completed for upcoming colonoscopy procedure scheduled on 12.3.2021    COVID test scheduled 11.30.2021    Reviewed procedural arrival time 0930 and facility location [facility].    Designated  policy reviewed. Instructed to have someone stay 24 hours post procedure.     Anticoagulation/blood thinners? ASA 81mg    Electronic implanted devices? no    Reviewed miralax and magnesium citrate prep instructions with patient. No fiber/iron supplements or foods that contain nuts/seeds 7 days prior to procedure.     Patient verbalized understanding and had no questions or concerns at this time.    Cassie Turner RN

## 2021-11-30 ENCOUNTER — LAB (OUTPATIENT)
Dept: LAB | Facility: CLINIC | Age: 61
End: 2021-11-30
Payer: COMMERCIAL

## 2021-11-30 DIAGNOSIS — Z11.59 ENCOUNTER FOR SCREENING FOR OTHER VIRAL DISEASES: ICD-10-CM

## 2021-11-30 DIAGNOSIS — E78.00 HIGH CHOLESTEROL: ICD-10-CM

## 2021-11-30 DIAGNOSIS — I10 ESSENTIAL HYPERTENSION: ICD-10-CM

## 2021-11-30 LAB
ANION GAP SERPL CALCULATED.3IONS-SCNC: 3 MMOL/L (ref 3–14)
BUN SERPL-MCNC: 11 MG/DL (ref 7–30)
CALCIUM SERPL-MCNC: 9.2 MG/DL (ref 8.5–10.1)
CHLORIDE BLD-SCNC: 108 MMOL/L (ref 94–109)
CHOLEST SERPL-MCNC: 160 MG/DL
CO2 SERPL-SCNC: 27 MMOL/L (ref 20–32)
CREAT SERPL-MCNC: 0.81 MG/DL (ref 0.52–1.04)
FASTING STATUS PATIENT QL REPORTED: YES
GFR SERPL CREATININE-BSD FRML MDRD: 78 ML/MIN/1.73M2
GLUCOSE BLD-MCNC: 100 MG/DL (ref 70–99)
HDLC SERPL-MCNC: 39 MG/DL
LDLC SERPL CALC-MCNC: 83 MG/DL
NONHDLC SERPL-MCNC: 121 MG/DL
POTASSIUM BLD-SCNC: 4.3 MMOL/L (ref 3.4–5.3)
SODIUM SERPL-SCNC: 138 MMOL/L (ref 133–144)
TRIGL SERPL-MCNC: 192 MG/DL

## 2021-11-30 PROCEDURE — 80048 BASIC METABOLIC PNL TOTAL CA: CPT

## 2021-11-30 PROCEDURE — 36415 COLL VENOUS BLD VENIPUNCTURE: CPT

## 2021-11-30 PROCEDURE — 80061 LIPID PANEL: CPT

## 2021-11-30 PROCEDURE — U0003 INFECTIOUS AGENT DETECTION BY NUCLEIC ACID (DNA OR RNA); SEVERE ACUTE RESPIRATORY SYNDROME CORONAVIRUS 2 (SARS-COV-2) (CORONAVIRUS DISEASE [COVID-19]), AMPLIFIED PROBE TECHNIQUE, MAKING USE OF HIGH THROUGHPUT TECHNOLOGIES AS DESCRIBED BY CMS-2020-01-R: HCPCS

## 2021-11-30 PROCEDURE — U0005 INFEC AGEN DETEC AMPLI PROBE: HCPCS

## 2021-12-01 LAB — SARS-COV-2 RNA RESP QL NAA+PROBE: NEGATIVE

## 2021-12-02 NOTE — RESULT ENCOUNTER NOTE
Devlis Fulton,       Your recent test results are attached, if you have any questions or concerns please feel free to contact me via e-mail or call [phone].  Cholesterol stable.  LDL is to goal.  Blood sugar is just slightly elevated.  Kidney function normal.       It was a pleasure to see you at your recent office visit.      Sincerely,  Eugenie Marroquin PA-C

## 2021-12-03 ENCOUNTER — DOCUMENTATION ONLY (OUTPATIENT)
Dept: GASTROENTEROLOGY | Facility: OUTPATIENT CENTER | Age: 61
End: 2021-12-03
Attending: PHYSICIAN ASSISTANT
Payer: COMMERCIAL

## 2021-12-03 ENCOUNTER — LAB REQUISITION (OUTPATIENT)
Dept: LAB | Facility: CLINIC | Age: 61
End: 2021-12-03
Payer: COMMERCIAL

## 2021-12-03 ENCOUNTER — TRANSFERRED RECORDS (OUTPATIENT)
Dept: HEALTH INFORMATION MANAGEMENT | Facility: CLINIC | Age: 61
End: 2021-12-03
Payer: COMMERCIAL

## 2021-12-03 DIAGNOSIS — R19.5 POSITIVE COLORECTAL CANCER SCREENING USING COLOGUARD TEST: ICD-10-CM

## 2021-12-03 PROCEDURE — 88305 TISSUE EXAM BY PATHOLOGIST: CPT | Mod: TC,ORL | Performed by: FAMILY MEDICINE

## 2021-12-03 PROCEDURE — 88305 TISSUE EXAM BY PATHOLOGIST: CPT | Mod: 26 | Performed by: PATHOLOGY

## 2021-12-06 LAB
PATH REPORT.COMMENTS IMP SPEC: NORMAL
PATH REPORT.FINAL DX SPEC: NORMAL
PATH REPORT.GROSS SPEC: NORMAL
PATH REPORT.MICROSCOPIC SPEC OTHER STN: NORMAL
PATH REPORT.RELEVANT HX SPEC: NORMAL
PHOTO IMAGE: NORMAL

## 2021-12-30 NOTE — TELEPHONE ENCOUNTER
"Dr. Rigo Cerda prescribed lisinopril 5mg, take one tab daily.  She took it Wed/Thur/Fri.  She states by Wednesday night she had an \"awful, aching headache and a stomach ache.\"  She took the medication in the morning and states symptoms never let up.  Saturday morning she decided not to take the med any longer.  Headache was gone by Saturday night and stomach \"so much better this morning\"   She states that she also has a sinus issue going on but says it wasn't a sinus headache.  Does feel has cold or allergies.  Please advise if should restart the medications and see if her symptoms come back or stop them completely.  She has not checked her BP; doesn't have a cuff and was babysitting grandchildren all weekend.  Mellisa Cho RN    "
Patient calling, Dr. Cerda prescribed a medication for blood pressure. She took it for 3 days and it caused stomach issues and headache. She is wondering what to do going forward.   
Per verbal order read back Dr. Rigo Cerda:  Restart the lisinopril 5mg, one tab in a.m. And see if her symptoms return.  Would like her to try to take it for a week.  If symptoms return; call us.  If they don't then continue med and recheck bp at  pharmacy in 2 weeks.  Mellisa Cho RN    
Spoke with patient.  She states understanding and will start the lisinopril again as per instructions below.  Mellisa Cho RN    
[facility]

## 2022-02-07 ENCOUNTER — TELEPHONE (OUTPATIENT)
Dept: FAMILY MEDICINE | Facility: CLINIC | Age: 62
End: 2022-02-07
Payer: COMMERCIAL

## 2022-02-07 DIAGNOSIS — R19.5 POSITIVE COLORECTAL CANCER SCREENING USING COLOGUARD TEST: Primary | ICD-10-CM

## 2022-02-07 NOTE — RESULT ENCOUNTER NOTE
PLEASE CALL PATIENT:  Dear Kirstin,      It was a pleasure to see you at your recent office visit.  Your test results are listed below.  Cologuard is positive. Need to do colonoscopy for better screening test is the next step. It's important she get the colonoscopy.  You can have false positive cologuards but we won't know unless we get the scope. Ordered.         If you have any questions or concerns, please call the clinic at [phone].    Sincerely,  Eugenie Marroquin PA-C

## 2022-02-07 NOTE — TELEPHONE ENCOUNTER
Left message on answering machine for patient/parent to call back.   [phone]  Cathleen Tejada RN

## 2022-02-07 NOTE — TELEPHONE ENCOUNTER
----- Message from Eugenie Marroquin PA-C sent at 2/7/2022 12:16 PM CST -----  PLEASE CALL PATIENT:  Dear Kirstin,      It was a pleasure to see you at your recent office visit.  Your test results are listed below.  Cologuard is positive. Need to do colonoscopy for better screening test is the next step. It's important she get the colonoscopy.  You can have false positive cologuards but we won't know unless we get the scope. Ordered.         If you have any questions or concerns, please call the clinic at [phone].    Sincerely,  Eugenie Marroquin PA-C

## 2022-02-15 ENCOUNTER — TELEPHONE (OUTPATIENT)
Dept: GASTROENTEROLOGY | Facility: CLINIC | Age: 62
End: 2022-02-15
Payer: COMMERCIAL

## 2022-03-13 ENCOUNTER — HEALTH MAINTENANCE LETTER (OUTPATIENT)
Age: 62
End: 2022-03-13

## 2022-03-25 ENCOUNTER — MYC MEDICAL ADVICE (OUTPATIENT)
Dept: FAMILY MEDICINE | Facility: CLINIC | Age: 62
End: 2022-03-25
Payer: COMMERCIAL

## 2022-03-25 DIAGNOSIS — R10.84 ABDOMINAL PAIN, GENERALIZED: Primary | ICD-10-CM

## 2022-03-28 ENCOUNTER — TRANSFERRED RECORDS (OUTPATIENT)
Dept: HEALTH INFORMATION MANAGEMENT | Facility: CLINIC | Age: 62
End: 2022-03-28
Payer: COMMERCIAL

## 2022-03-30 ENCOUNTER — OFFICE VISIT (OUTPATIENT)
Dept: FAMILY MEDICINE | Facility: CLINIC | Age: 62
End: 2022-03-30
Payer: COMMERCIAL

## 2022-03-30 VITALS
WEIGHT: 194 LBS | RESPIRATION RATE: 16 BRPM | BODY MASS INDEX: 33.12 KG/M2 | HEART RATE: 98 BPM | SYSTOLIC BLOOD PRESSURE: 134 MMHG | HEIGHT: 64 IN | TEMPERATURE: 97.8 F | DIASTOLIC BLOOD PRESSURE: 83 MMHG | OXYGEN SATURATION: 96 %

## 2022-03-30 DIAGNOSIS — E27.8 ADRENAL MASS (H): ICD-10-CM

## 2022-03-30 DIAGNOSIS — Z12.4 CERVICAL CANCER SCREENING: ICD-10-CM

## 2022-03-30 DIAGNOSIS — Z12.31 ENCOUNTER FOR SCREENING MAMMOGRAM FOR BREAST CANCER: Primary | ICD-10-CM

## 2022-03-30 DIAGNOSIS — K57.30 DIVERTICULAR DISEASE OF COLON: ICD-10-CM

## 2022-03-30 DIAGNOSIS — Z12.31 VISIT FOR SCREENING MAMMOGRAM: ICD-10-CM

## 2022-03-30 PROCEDURE — 99214 OFFICE O/P EST MOD 30 MIN: CPT | Performed by: FAMILY MEDICINE

## 2022-03-30 PROCEDURE — 87624 HPV HI-RISK TYP POOLED RSLT: CPT | Performed by: FAMILY MEDICINE

## 2022-03-30 PROCEDURE — G0145 SCR C/V CYTO,THINLAYER,RESCR: HCPCS | Performed by: FAMILY MEDICINE

## 2022-03-30 NOTE — PROGRESS NOTES
"SUBJECTIVE:  61 year old.The patient has a complaint of abdominal pain.  This started 9 days ago. Location mid to low pelvis quality pressure Associated symptoms are bloating.  Brought on by unknown . Normal colonoscopy in Dec 2021. [facility]. ROS no vaginal bleeding, no fever or chills      Reviewed health maintenance  Patient Active Problem List   Diagnosis     Abnormal Pap smear     Menopause     S/P oophorectomy     Lumbar radiculopathy     Chronic rhinitis     Skin tag     Benign neoplasm of skin of trunk, except scrotum     Backache     Dry eye syndrome     High cholesterol     Skin lesion     Advanced directives, counseling/discussion     BMI 34.0-34.9,adult     Past Medical History:   Diagnosis Date     Abdominal or pelvic swelling, mass or lump, unspecified site      Abnormal glandular Papanicolaou smear of cervix      Abnormal Pap smear 09/06/2006     Arthritis      Backache, unspecified      Cancer (H)     Grandmother, mother     Congestive heart failure (H)     Grandmother     Insomnia, unspecified      Other specified trigeminal nerve disorders      Thoracic or lumbosacral neuritis or radiculitis, unspecified      Thyroid disease     Daughter     Unspecified disorder of menstruation and other abnormal bleeding from female genital tract      Unspecified symptom associated with female genital organs      Viral warts, unspecified        OBJECTIVE:  no apparent distress  /83   Pulse 98   Temp 97.8  F (36.6  C) (Tympanic)   Resp 16   Ht 1.626 m (5' 4\")   Wt 88 kg (194 lb)   SpO2 96%   Breastfeeding No   BMI 33.30 kg/m      LUNGS:  CTA B/L, no wheezing or crackles.   Cardiovascular: negative, PMI normal. No lifts, heaves, or thrills. RRR. No murmurs, clicks gallops or rub   Gastrointestinal: Abdomen soft, non-tender. BS normal. No masses, organomegaly.       ICD-10-CM    1. Encounter for screening mammogram for breast cancer  Z12.31    2. Visit for " screening mammogram  Z12.31 MA SCREENING DIGITAL BILAT - Future  (s+30)   3. Cervical cancer screening  Z12.4 Pap screen with HPV - recommended age 30 - 65 years   4. Adrenal mass (H)  E27.8 MR Adrenal wo and w Contrast   5. Diverticular disease of colon  K57.30     PLAN: since patient is improving with pain level will follow with no antibiotics.  Will get a mri with and without contrast of adrenal gland.      Answers for HPI/ROS submitted by the patient on 3/27/2022  How many servings of fruits and vegetables do you eat daily?: 0-1  On average, how many sweetened beverages do you drink each day (Examples: soda, juice, sweet tea, etc.  Do NOT count diet or artificially sweetened beverages)?: 2  How many minutes a day do you exercise enough to make your heart beat faster?: 9 or less  How many days a week do you exercise enough to make your heart beat faster?: 3 or less  How many days per week do you miss taking your medication?: 0  What is the reason for your visit today?: Abdiminal pain and pressure  When did your symptoms begin?: 3-7 days ago  What are your symptoms?: Abdominal pain and pressure  How would you describe these symptoms?: Mild  Are your symptoms:: Improving  Have you had these symptoms before?: Yes  Have you tried or received treatment for these symptoms before?: Yes  Did that treatment work? : Yes  Please describe the treatment you had:: Ovarian cyst, ovary and fallopian tube removed years ago  Is there anything that makes you feel worse?: No  Is there anything that makes you feel better?: No

## 2022-04-03 ENCOUNTER — HOSPITAL ENCOUNTER (OUTPATIENT)
Dept: MRI IMAGING | Facility: CLINIC | Age: 62
Discharge: HOME | End: 2022-04-03
Attending: FAMILY MEDICINE | Admitting: FAMILY MEDICINE
Payer: COMMERCIAL

## 2022-04-03 DIAGNOSIS — E27.8 ADRENAL MASS (H): ICD-10-CM

## 2022-04-03 PROCEDURE — 255N000002 HC RX 255 OP 636: Performed by: FAMILY MEDICINE

## 2022-04-03 PROCEDURE — 74183 MRI ABD W/O CNTR FLWD CNTR: CPT

## 2022-04-03 PROCEDURE — A9585 GADOBUTROL INJECTION: HCPCS | Performed by: FAMILY MEDICINE

## 2022-04-03 RX ORDER — GADOBUTROL 604.72 MG/ML
8 INJECTION INTRAVENOUS ONCE
Status: COMPLETED | OUTPATIENT
Start: 2022-04-03 | End: 2022-04-03

## 2022-04-03 RX ADMIN — GADOBUTROL 8 ML: 604.72 INJECTION INTRAVENOUS at 13:24

## 2022-04-04 LAB
BKR LAB AP GYN ADEQUACY: NORMAL
BKR LAB AP GYN INTERPRETATION: NORMAL
BKR LAB AP HPV REFLEX: NORMAL
BKR LAB AP PREVIOUS ABNORMAL: NORMAL
PATH REPORT.COMMENTS IMP SPEC: NORMAL
PATH REPORT.COMMENTS IMP SPEC: NORMAL
PATH REPORT.RELEVANT HX SPEC: NORMAL

## 2022-04-06 LAB
HUMAN PAPILLOMA VIRUS 16 DNA: NEGATIVE
HUMAN PAPILLOMA VIRUS 18 DNA: NEGATIVE
HUMAN PAPILLOMA VIRUS FINAL DIAGNOSIS: NORMAL
HUMAN PAPILLOMA VIRUS OTHER HR: NEGATIVE

## 2022-05-08 ENCOUNTER — HEALTH MAINTENANCE LETTER (OUTPATIENT)
Age: 62
End: 2022-05-08

## 2022-07-28 ENCOUNTER — ANCILLARY PROCEDURE (OUTPATIENT)
Dept: MAMMOGRAPHY | Facility: CLINIC | Age: 62
End: 2022-07-28
Attending: FAMILY MEDICINE
Payer: COMMERCIAL

## 2022-07-28 DIAGNOSIS — Z12.31 VISIT FOR SCREENING MAMMOGRAM: ICD-10-CM

## 2022-07-28 PROCEDURE — 77063 BREAST TOMOSYNTHESIS BI: CPT | Mod: TC | Performed by: RADIOLOGY

## 2022-07-28 PROCEDURE — 77067 SCR MAMMO BI INCL CAD: CPT | Mod: TC | Performed by: RADIOLOGY

## 2022-07-30 ENCOUNTER — MYC MEDICAL ADVICE (OUTPATIENT)
Dept: FAMILY MEDICINE | Facility: CLINIC | Age: 62
End: 2022-07-30

## 2022-07-30 DIAGNOSIS — I10 ESSENTIAL HYPERTENSION: ICD-10-CM

## 2022-07-30 DIAGNOSIS — E78.00 HIGH CHOLESTEROL: ICD-10-CM

## 2022-08-01 RX ORDER — LISINOPRIL 5 MG/1
5 TABLET ORAL DAILY
Qty: 90 TABLET | Refills: 0 | Status: SHIPPED | OUTPATIENT
Start: 2022-08-01 | End: 2022-10-03

## 2022-08-01 RX ORDER — ATORVASTATIN CALCIUM 10 MG/1
10 TABLET, FILM COATED ORAL DAILY
Qty: 90 TABLET | Refills: 0 | Status: SHIPPED | OUTPATIENT
Start: 2022-08-01 | End: 2022-10-03

## 2022-10-03 ENCOUNTER — MYC REFILL (OUTPATIENT)
Dept: FAMILY MEDICINE | Facility: CLINIC | Age: 62
End: 2022-10-03

## 2022-10-03 DIAGNOSIS — E78.00 HIGH CHOLESTEROL: ICD-10-CM

## 2022-10-03 DIAGNOSIS — I10 ESSENTIAL HYPERTENSION: ICD-10-CM

## 2022-10-03 RX ORDER — ATORVASTATIN CALCIUM 10 MG/1
10 TABLET, FILM COATED ORAL DAILY
Qty: 90 TABLET | Refills: 0 | Status: SHIPPED | OUTPATIENT
Start: 2022-10-03 | End: 2022-11-01

## 2022-10-03 RX ORDER — LISINOPRIL 5 MG/1
5 TABLET ORAL DAILY
Qty: 90 TABLET | Refills: 0 | Status: SHIPPED | OUTPATIENT
Start: 2022-10-03 | End: 2022-11-01

## 2022-11-01 NOTE — PROGRESS NOTES
History of Present Illness       Reason for visit:  Renewal of meds. Possible diverticulitis. Ear ache and sinuses. Discuss occasional chest pain. Discuss occasional back pain.    She eats 0-1 servings of fruits and vegetables daily.She consumes 2 sweetened beverage(s) daily.She exercises with enough effort to increase her heart rate 9 or less minutes per day.  She exercises with enough effort to increase her heart rate 3 or less days per week.   She is taking medications regularly.     Plugged ears, ear pain, sinus pain and pressure, tooth pain. Cold in October 11 th, still has the symptoms. Popping in the ear (right)  SUBJECTIVE:  62 year oldyear old female enters with  hypertension.  Pt. Has been compliant with medications and medications were reviewed.  No side effects. No chest pain or sob. Low sodium diet.    Current Outpatient Medications:      aspirin 81 MG tablet, Take 1 tablet (81 mg) by mouth daily, Disp: 90 tablet, Rfl: 3     atorvastatin (LIPITOR) 10 MG tablet, Take 1 tablet (10 mg) by mouth daily, Disp: 90 tablet, Rfl: 3     cetirizine (ZYRTEC) 10 MG tablet, Take 1 tablet by mouth daily., Disp: 90 tablet, Rfl: 3     IBUPROFEN PO, Take 2 tablets by mouth daily, Disp: , Rfl:      lisinopril (ZESTRIL) 5 MG tablet, Take 1 tablet (5 mg) by mouth daily, Disp: 90 tablet, Rfl: 3  Past Medical History:   Diagnosis Date     Abdominal or pelvic swelling, mass or lump, unspecified site      Abnormal glandular Papanicolaou smear of cervix      Abnormal Pap smear 09/06/2006     Arthritis      Backache, unspecified      Cancer (H)     Grandmother, mother     Congestive heart failure (H)     Grandmother     Insomnia, unspecified      Other specified trigeminal nerve disorders      Thoracic or lumbosacral neuritis or radiculitis, unspecified      Thyroid disease     Daughter     Unspecified disorder of menstruation and other abnormal bleeding from female genital tract      Unspecified symptom associated with female  "genital organs      Viral warts, unspecified      Office Visit on 03/30/2022   Component Date Value Ref Range Status     Interpretation 03/30/2022 Negative for Intraepithelial Lesion or Malignancy (NILM)    Final     Comment 03/30/2022    Final                    Value:This result contains rich text formatting which cannot be displayed here.     Specimen Adequacy 03/30/2022 Satisfactory for evaluation, endocervical/transformation zone component absent   Final     Clinical Information 03/30/2022    Final                    Value:This result contains rich text formatting which cannot be displayed here.     Reflex Testing 03/30/2022 Yes regardless of result   Final     Previous Abnormal? 03/30/2022    Final                    Value:This result contains rich text formatting which cannot be displayed here.     Performing Labs 03/30/2022    Final                    Value:This result contains rich text formatting which cannot be displayed here.     Other HR HPV 03/30/2022 Negative  Negative Final     HPV16 DNA 03/30/2022 Negative  Negative Final     HPV18 DNA 03/30/2022 Negative  Negative Final     FINAL DIAGNOSIS 03/30/2022    Final                    Value:This result contains rich text formatting which cannot be displayed here.      Reviewed health maintenance  Patient Active Problem List   Diagnosis     Abnormal Pap smear     Menopause     S/P oophorectomy     Lumbar radiculopathy     Chronic rhinitis     Skin tag     Benign neoplasm of skin of trunk, except scrotum     Backache     Dry eye syndrome     High cholesterol     Skin lesion     Advanced directives, counseling/discussion     BMI 34.0-34.9,adult         OBJECTIVE:  no apparent distress  /80   Pulse 82   Temp (!) 96.3  F (35.7  C) (Tympanic)   Resp 24   Ht 1.626 m (5' 4\")   Wt 84.4 kg (186 lb)   SpO2 98%   BMI 31.93 kg/m       Head: Normocephalic. No masses, lesions, tenderness or abnormalities.  Neck::Neck supple. No adenopathy. Thyroid " symmetric, normal size.    Cardiovascular: negative. No lifts, heaves, or thrills. RRR. No murmurs, clicks gallops or rubs  Respiratory. Good diaphragmatic excursion. Lungs clear  Gastrointestinal:Abdomen soft, non-tender.  No masses, organomegaly    Office Visit on 03/30/2022   Component Date Value Ref Range Status     Interpretation 03/30/2022 Negative for Intraepithelial Lesion or Malignancy (NILM)    Final     Comment 03/30/2022    Final                    Value:This result contains rich text formatting which cannot be displayed here.     Specimen Adequacy 03/30/2022 Satisfactory for evaluation, endocervical/transformation zone component absent   Final     Clinical Information 03/30/2022    Final                    Value:This result contains rich text formatting which cannot be displayed here.     Reflex Testing 03/30/2022 Yes regardless of result   Final     Previous Abnormal? 03/30/2022    Final                    Value:This result contains rich text formatting which cannot be displayed here.     Performing Labs 03/30/2022    Final                    Value:This result contains rich text formatting which cannot be displayed here.     Other HR HPV 03/30/2022 Negative  Negative Final     HPV16 DNA 03/30/2022 Negative  Negative Final     HPV18 DNA 03/30/2022 Negative  Negative Final     FINAL DIAGNOSIS 03/30/2022    Final                    Value:This result contains rich text formatting which cannot be displayed here.       SUBJECTIVE:  62 year old enters for recheck of high cholesterol.  Pt. Has been taking med and has no side effects. Pt is following diet.  Denies chest pain and SOB.  Past Medical History:   Diagnosis Date     Abdominal or pelvic swelling, mass or lump, unspecified site      Abnormal glandular Papanicolaou smear of cervix      Abnormal Pap smear 09/06/2006     Arthritis      Backache, unspecified      Cancer (H)     Grandmother, mother     Congestive heart failure (H)     Grandmother      "Insomnia, unspecified      Other specified trigeminal nerve disorders      Thoracic or lumbosacral neuritis or radiculitis, unspecified      Thyroid disease     Daughter     Unspecified disorder of menstruation and other abnormal bleeding from female genital tract      Unspecified symptom associated with female genital organs      Viral warts, unspecified      Past Surgical History:   Procedure Laterality Date     ABDOMEN SURGERY      Gall bladder removed. Fallopian tube removed     CHOLECYSTECTOMY, LAPOROSCOPIC      Cholecystectomy, Laparoscopic     COLONOSCOPY  12/03/2021    2 small polops removed.     [facility] OOPHORECTORMY FOR JALEEL, W/BX  09/29/2006    left ovary and fallopian tube, left salpingo-oophorectomy       Current Outpatient Medications:      aspirin 81 MG tablet, Take 1 tablet (81 mg) by mouth daily, Disp: 90 tablet, Rfl: 3     atorvastatin (LIPITOR) 10 MG tablet, Take 1 tablet (10 mg) by mouth daily, Disp: 90 tablet, Rfl: 3     cetirizine (ZYRTEC) 10 MG tablet, Take 1 tablet by mouth daily., Disp: 90 tablet, Rfl: 3     IBUPROFEN PO, Take 2 tablets by mouth daily, Disp: , Rfl:      lisinopril (ZESTRIL) 5 MG tablet, Take 1 tablet (5 mg) by mouth daily, Disp: 90 tablet, Rfl: 3  Reviewed health maintenance   Patient Active Problem List   Diagnosis     Abnormal Pap smear     Menopause     S/P oophorectomy     Lumbar radiculopathy     Chronic rhinitis     Skin tag     Benign neoplasm of skin of trunk, except scrotum     Backache     Dry eye syndrome     High cholesterol     Skin lesion     Advanced directives, counseling/discussion     BMI 34.0-34.9,adult       OBJECTIVE:  no apparent distress  /80   Pulse 82   Temp (!) 96.3  F (35.7  C) (Tympanic)   Resp 24   Ht 1.626 m (5' 4\")   Wt 84.4 kg (186 lb)   SpO2 98%   BMI 31.93 kg/m        Exam:  Constitutional: healthy, alert and no distress  Head: Normocephalic. No masses, lesions, tenderness or abnormalities  Neck: Neck supple. No adenopathy. Thyroid " symmetric, normal size,  Cardiovascular: negative, PMI normal. No lifts, heaves, or thrills. RRR. No murmurs, clicks gallops or rub  Respiratory: negative Lungs clear    Office Visit on 03/30/2022   Component Date Value Ref Range Status     Interpretation 03/30/2022 Negative for Intraepithelial Lesion or Malignancy (NILM)    Final     Comment 03/30/2022    Final                    Value:This result contains rich text formatting which cannot be displayed here.     Specimen Adequacy 03/30/2022 Satisfactory for evaluation, endocervical/transformation zone component absent   Final     Clinical Information 03/30/2022    Final                    Value:This result contains rich text formatting which cannot be displayed here.     Reflex Testing 03/30/2022 Yes regardless of result   Final     Previous Abnormal? 03/30/2022    Final                    Value:This result contains rich text formatting which cannot be displayed here.     Performing Labs 03/30/2022    Final                    Value:This result contains rich text formatting which cannot be displayed here.     Other HR HPV 03/30/2022 Negative  Negative Final     HPV16 DNA 03/30/2022 Negative  Negative Final     HPV18 DNA 03/30/2022 Negative  Negative Final     FINAL DIAGNOSIS 03/30/2022    Final                    Value:This result contains rich text formatting which cannot be displayed here.           ICD-10-CM    1. High cholesterol  E78.00 atorvastatin (LIPITOR) 10 MG tablet     **Comprehensive metabolic panel FUTURE 2mo     Lipid panel reflex to direct LDL Fasting      2. Essential hypertension  I10 lisinopril (ZESTRIL) 5 MG tablet     **Comprehensive metabolic panel FUTURE 2mo     Lipid panel reflex to direct LDL Fasting       PLAN: Follow up in 1 year  The 10-year ASCVD risk score (Semaj JOSEPH, et al., 2019) is: 13.9%    Values used to calculate the score:      Age: 62 years      Sex: Female      Is Non- : No      Diabetic: No      Tobacco  smoker: Yes      Systolic Blood Pressure: 139 mmHg      Is BP treated: Yes      HDL Cholesterol: 44 mg/dL      Total Cholesterol: 174 mg/dL

## 2022-11-02 ENCOUNTER — OFFICE VISIT (OUTPATIENT)
Dept: FAMILY MEDICINE | Facility: CLINIC | Age: 62
End: 2022-11-02
Payer: COMMERCIAL

## 2022-11-02 ENCOUNTER — MYC MEDICAL ADVICE (OUTPATIENT)
Dept: FAMILY MEDICINE | Facility: CLINIC | Age: 62
End: 2022-11-02

## 2022-11-02 VITALS
SYSTOLIC BLOOD PRESSURE: 139 MMHG | HEIGHT: 64 IN | HEART RATE: 82 BPM | TEMPERATURE: 96.3 F | BODY MASS INDEX: 31.76 KG/M2 | OXYGEN SATURATION: 98 % | DIASTOLIC BLOOD PRESSURE: 80 MMHG | WEIGHT: 186 LBS | RESPIRATION RATE: 24 BRPM

## 2022-11-02 DIAGNOSIS — I10 ESSENTIAL HYPERTENSION: ICD-10-CM

## 2022-11-02 DIAGNOSIS — E78.00 HIGH CHOLESTEROL: ICD-10-CM

## 2022-11-02 LAB
ALBUMIN SERPL-MCNC: 4 G/DL (ref 3.4–5)
ALP SERPL-CCNC: 108 U/L (ref 40–150)
ALT SERPL W P-5'-P-CCNC: 22 U/L (ref 0–50)
ANION GAP SERPL CALCULATED.3IONS-SCNC: 2 MMOL/L (ref 3–14)
AST SERPL W P-5'-P-CCNC: 12 U/L (ref 0–45)
BILIRUB SERPL-MCNC: 0.4 MG/DL (ref 0.2–1.3)
BUN SERPL-MCNC: 9 MG/DL (ref 7–30)
CALCIUM SERPL-MCNC: 9.2 MG/DL (ref 8.5–10.1)
CHLORIDE BLD-SCNC: 105 MMOL/L (ref 94–109)
CHOLEST SERPL-MCNC: 174 MG/DL
CO2 SERPL-SCNC: 30 MMOL/L (ref 20–32)
CREAT SERPL-MCNC: 0.74 MG/DL (ref 0.52–1.04)
FASTING STATUS PATIENT QL REPORTED: YES
GFR SERPL CREATININE-BSD FRML MDRD: 90 ML/MIN/1.73M2
GLUCOSE BLD-MCNC: 99 MG/DL (ref 70–99)
HDLC SERPL-MCNC: 44 MG/DL
LDLC SERPL CALC-MCNC: 92 MG/DL
NONHDLC SERPL-MCNC: 130 MG/DL
POTASSIUM BLD-SCNC: 4.6 MMOL/L (ref 3.4–5.3)
PROT SERPL-MCNC: 7.4 G/DL (ref 6.8–8.8)
SODIUM SERPL-SCNC: 137 MMOL/L (ref 133–144)
TRIGL SERPL-MCNC: 192 MG/DL

## 2022-11-02 PROCEDURE — 36415 COLL VENOUS BLD VENIPUNCTURE: CPT | Performed by: FAMILY MEDICINE

## 2022-11-02 PROCEDURE — 80061 LIPID PANEL: CPT | Performed by: FAMILY MEDICINE

## 2022-11-02 PROCEDURE — 99214 OFFICE O/P EST MOD 30 MIN: CPT | Performed by: FAMILY MEDICINE

## 2022-11-02 PROCEDURE — 80053 COMPREHEN METABOLIC PANEL: CPT | Performed by: FAMILY MEDICINE

## 2022-11-02 RX ORDER — LISINOPRIL 5 MG/1
5 TABLET ORAL DAILY
Qty: 90 TABLET | Refills: 3 | Status: SHIPPED | OUTPATIENT
Start: 2022-11-02 | End: 2024-01-02

## 2022-11-02 RX ORDER — ATORVASTATIN CALCIUM 10 MG/1
10 TABLET, FILM COATED ORAL DAILY
Qty: 90 TABLET | Refills: 3 | Status: SHIPPED | OUTPATIENT
Start: 2022-11-02 | End: 2023-08-23

## 2022-11-03 ENCOUNTER — MYC MEDICAL ADVICE (OUTPATIENT)
Dept: FAMILY MEDICINE | Facility: CLINIC | Age: 62
End: 2022-11-03

## 2022-11-03 DIAGNOSIS — E78.00 HIGH CHOLESTEROL: Primary | ICD-10-CM

## 2022-11-03 RX ORDER — ATORVASTATIN CALCIUM 20 MG/1
20 TABLET, FILM COATED ORAL DAILY
Qty: 90 TABLET | Refills: 3 | Status: SHIPPED | OUTPATIENT
Start: 2022-11-03 | End: 2023-10-18

## 2023-03-30 ENCOUNTER — TELEPHONE (OUTPATIENT)
Dept: FAMILY MEDICINE | Facility: CLINIC | Age: 63
End: 2023-03-30
Payer: COMMERCIAL

## 2023-03-30 NOTE — TELEPHONE ENCOUNTER
Patient Quality Outreach    Patient is due for the following:   Physical Preventive Adult Physical      Topic Date Due     Pneumococcal Vaccine (1 - PCV) Never done     COVID-19 Vaccine (5 - Booster for Pfizer series) 08/31/2022       Next Steps:   Schedule a Adult Preventative    Type of outreach:    Sent [employer] message.      Questions for provider review:    None     SHAYLEE SCHRADER MA

## 2023-04-11 NOTE — PATIENT INSTRUCTIONS
1.  DiGeorge syndrome  2.  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  Subsequent 2 ventricle repair in 2009 at [facility] with Rastelli type repair (VSD closure to the right sided jordy-aortic valve, RV to PA conduit)  - mild right ventricle to pulmonary artery conduit obstruction and free insufficiency now s/p Yessy Valve implantation on 22 Ensemble 3/5/21.  Now with mild obstruction and no significant insufficiency.  - aortic arch obstruction distal to the origin of the carotid arteries but proximal to the origin of the subclavian arteries s/p cardiac cath and stent placement in arch, 1/21/20, with excellent result  - unclear severity of aortic insufficiency, no significant leak noted on echocardiogram in November although leak looked more significant and diastolic murmur heard during hospitalization December 2022.  3.  Congestive heart failure with significant biventricular dysfunction, systolic dysfunction significantly improved but still with diastolic dysfunction  - acute viral illness raised concerns about possible myocarditis, treated with IVIG at [facility] in 2020.  - now with low normal to mildly reduced LV systolic function, stable with low BNP  - lower extremity edema and varicosities likely due to a combination of venous obstruction, hypoalbuminemia due to protein-losing enteropathy, and diastolic heart failure.   4.  History of Ventricular tachycardia and frequent ventricular ectopy, previously on lidocaine prior to intervention for arch obstruction.  No VT on holter 3/2020  5.  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  6.  Bilateral vocal cord paralysis with longstanding tracheostomy, followed by Dr. Eid.  Also with restrictive lung disease.  7.  Chronic idiopathic thrombocytopenia with recent diagnosis of ITP with admit  Take one pill (5 mg) one time per day for one week and if not at goal of 140/90 then  Take one pill two times per day for one week and if not at goal then   Take two pills twice per day for one week and if not at goal then send me an e mail with your blood pressures.    If you reach goal at any of the above then stay at that dose and make two appointments.  First with the lab and then several days later with me.  E mail me if you have any problems     12/4/20.  Platelet count improved on Promacta.           - switched from lasix to torsemide due to these concerns in the past  8.  Multiple infections requiring hospitalization  - Admitted to the hospital November 6 through November 14, 2021 with SIRS syndrome, rhinovirus/enterovirus positive as was a respiratory culture for Pseudomonas and Klebsiella, much improved  - admitted 12/25/21 with Covid requiring ICU admit, HME/Bipap, calcium drip  - readmission July 2022 with COVID, did well  - admission to [facility] 2022 with influenza complicated by pleural effusions  - now admitted with parainfluenza  9.  Concerns about gynecomastia, low testosterone levels.  Possibly related to spironolactone - now on eplenerone.  10.  History of hypocalcemia, followed by endocrine.  - Calcium much lower, suspect combination of low albumin, DiGeorge, mom not giving enough calcium at home  11. 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 continue home doses of torsemide, eplenerenone  2. Correct calcium, discuss dosing with endocrine  3. Agree with another dose of albumin and lasix  4. Discuss dosing of entocort with GI  5. Mom needs pharmacy teaching - she is very unclear about med dosing  I will follow during this admit

## 2023-04-16 ENCOUNTER — TRANSFERRED RECORDS (OUTPATIENT)
Dept: FAMILY MEDICINE | Facility: CLINIC | Age: 63
End: 2023-04-16
Payer: COMMERCIAL

## 2023-04-25 ENCOUNTER — TRANSFERRED RECORDS (OUTPATIENT)
Dept: FAMILY MEDICINE | Facility: CLINIC | Age: 63
End: 2023-04-25
Payer: COMMERCIAL

## 2023-08-23 ENCOUNTER — OFFICE VISIT (OUTPATIENT)
Dept: FAMILY MEDICINE | Facility: CLINIC | Age: 63
End: 2023-08-23
Payer: COMMERCIAL

## 2023-08-23 VITALS
SYSTOLIC BLOOD PRESSURE: 138 MMHG | TEMPERATURE: 98.7 F | BODY MASS INDEX: 29.67 KG/M2 | OXYGEN SATURATION: 94 % | RESPIRATION RATE: 16 BRPM | DIASTOLIC BLOOD PRESSURE: 80 MMHG | WEIGHT: 173.8 LBS | HEART RATE: 118 BPM | HEIGHT: 64 IN

## 2023-08-23 DIAGNOSIS — F17.210 SMOKES WITH GREATER THAN 30 PACK YEAR HISTORY: Primary | ICD-10-CM

## 2023-08-23 DIAGNOSIS — E27.8 ADRENAL MASS (H): ICD-10-CM

## 2023-08-23 PROCEDURE — 90471 IMMUNIZATION ADMIN: CPT | Performed by: FAMILY MEDICINE

## 2023-08-23 PROCEDURE — 99214 OFFICE O/P EST MOD 30 MIN: CPT | Mod: 25 | Performed by: FAMILY MEDICINE

## 2023-08-23 PROCEDURE — 90677 PCV20 VACCINE IM: CPT | Performed by: FAMILY MEDICINE

## 2023-08-23 ASSESSMENT — PAIN SCALES - GENERAL: PAINLEVEL: NO PAIN (0)

## 2023-08-23 NOTE — PROGRESS NOTES
Subjective   Kirstin is a 62 year old, presenting for the following health issues:  Orders        8/23/2023     2:17 PM   Additional Questions   Roomed by Saida GARCIA   Accompanied by self       History of Present Illness       Back Pain:  She presents for follow up of back pain. Patient's back pain is a recurring problem.  Location of back pain:  Right middle of back  Description of back pain: dull ache  Back pain spreads: nowhere    Since patient first noticed back pain, pain is: always present, but gets better and worse  Does back pain interfere with her job:  No       Reason for visit:  Back pain, persistent cough, sore knee (possible chip or fracture), heart check, follow up on adrenal gland tumor from a year ago    She eats 0-1 servings of fruits and vegetables daily.She consumes 2 sweetened beverage(s) daily.She exercises with enough effort to increase her heart rate 9 or less minutes per day.  She exercises with enough effort to increase her heart rate 5 days per week.   She is taking medications regularly.   SUBJECTIVE:  62 year old.The patient has a complaint of multiple problems. This includes smokes more than 30 pack years, has a history of adrenal mass.  She is also having problems at home with son,daughter in law and 3 grandchildren5 Reviewed health maintenance  Patient Active Problem List   Diagnosis    Abnormal Pap smear    Menopause    S/P oophorectomy    Lumbar radiculopathy    Chronic rhinitis    Skin tag    Benign neoplasm of skin of trunk, except scrotum    Backache    Dry eye syndrome    High cholesterol    Skin lesion    Advanced directives, counseling/discussion    BMI 34.0-34.9,adult     Past Medical History:   Diagnosis Date    Abdominal or pelvic swelling, mass or lump, unspecified site     Abnormal glandular Papanicolaou smear of cervix     Abnormal Pap smear 09/06/2006    Arthritis     Backache, unspecified     Cancer (H)     Grandmother, mother    Congestive heart failure (H)      "Grandmother    Insomnia, unspecified     Other specified trigeminal nerve disorders     Thoracic or lumbosacral neuritis or radiculitis, unspecified     Thyroid disease     Daughter    Unspecified disorder of menstruation and other abnormal bleeding from female genital tract     Unspecified symptom associated with female genital organs     Viral warts, unspecified        OBJECTIVE:  no apparent distress  /80   Pulse 118   Temp 98.7  F (37.1  C) (Tympanic)   Resp 16   Ht 1.626 m (5' 4\")   Wt 78.8 kg (173 lb 12.8 oz)   SpO2 94%   BMI 29.83 kg/m      LUNGS:  CTA B/L, no wheezing or crackles.   Cardiovascular: negative, PMI normal. No lifts, heaves, or thrills. RRR. No murmurs, clicks gallops or rub   Gastrointestinal: Abdomen soft, non-tender. BS normal. No masses, organomegaly       ICD-10-CM    1. Smokes with greater than 30 pack year history  F17.210 CT Chest Lung Cancer Screen Low Dose Without      2. Adrenal mass (H)  E27.8 MR Adrenal wo and w Contrast       PLAN: await results  Over  half of theTotal time 25 minutes spent in cordination care. Discussed diagnoses, prognoses and treatment.       "

## 2023-08-23 NOTE — NURSING NOTE
Prior to immunization administration, verified patients identity using patient s name and date of birth. Please see Immunization Activity for additional information.     Screening Questionnaire for Adult Immunization    Are you sick today?   No   Do you have allergies to medications, food, a vaccine component or latex?   No   Have you ever had a serious reaction after receiving a vaccination?   No   Do you have a long-term health problem with heart, lung, kidney, or metabolic disease (e.g., diabetes), asthma, a blood disorder, no spleen, complement component deficiency, a cochlear implant, or a spinal fluid leak?  Are you on long-term aspirin therapy?   No   Do you have cancer, leukemia, HIV/AIDS, or any other immune system problem?   No   Do you have a parent, brother, or sister with an immune system problem?   No   In the past 3 months, have you taken medications that affect  your immune system, such as prednisone, other steroids, or anticancer drugs; drugs for the treatment of rheumatoid arthritis, Crohn s disease, or psoriasis; or have you had radiation treatments?   No   Have you had a seizure, or a brain or other nervous system problem?   No   During the past year, have you received a transfusion of blood or blood    products, or been given immune (gamma) globulin or antiviral drug?   No   For women: Are you pregnant or is there a chance you could become       pregnant during the next month?   No   Have you received any vaccinations in the past 4 weeks?   No     Immunization questionnaire answers were all negative.      Patient instructed to remain in clinic for 15 minutes afterwards, and to report any adverse reactions.     Screening performed by SHAYLEE SCHRADER MA on 8/23/2023 at 3:06 PM.

## 2023-09-29 ENCOUNTER — ANCILLARY PROCEDURE (OUTPATIENT)
Dept: MAMMOGRAPHY | Facility: CLINIC | Age: 63
End: 2023-09-29
Attending: FAMILY MEDICINE
Payer: COMMERCIAL

## 2023-09-29 DIAGNOSIS — Z12.31 VISIT FOR SCREENING MAMMOGRAM: ICD-10-CM

## 2023-09-29 PROCEDURE — 77067 SCR MAMMO BI INCL CAD: CPT | Mod: TC | Performed by: RADIOLOGY

## 2023-09-29 PROCEDURE — 77063 BREAST TOMOSYNTHESIS BI: CPT | Mod: TC | Performed by: RADIOLOGY

## 2023-10-18 DIAGNOSIS — E78.00 HIGH CHOLESTEROL: ICD-10-CM

## 2023-10-18 RX ORDER — ATORVASTATIN CALCIUM 20 MG/1
20 TABLET, FILM COATED ORAL DAILY
Qty: 90 TABLET | Refills: 2 | Status: SHIPPED | OUTPATIENT
Start: 2023-10-18 | End: 2024-06-25

## 2023-12-15 ENCOUNTER — DOCUMENTATION ONLY (OUTPATIENT)
Dept: FAMILY MEDICINE | Facility: CLINIC | Age: 63
End: 2023-12-15
Payer: COMMERCIAL

## 2023-12-15 DIAGNOSIS — E78.00 HIGH CHOLESTEROL: Primary | ICD-10-CM

## 2023-12-15 NOTE — PROGRESS NOTES
Kirstin Troy has an upcoming lab appointment:    Future Appointments   Date Time Provider Department Center   12/22/2023  2:45 PM AN LAB [facility]     There is no order available. Please review and place either future orders or HMPO (Review of Health Maintenance Protocol Orders), as appropriate.    Health Maintenance Due   Topic    BMP     LIPID     ANNUAL REVIEW OF HM ORDERS      David GREGORIOT

## 2023-12-18 ENCOUNTER — LAB (OUTPATIENT)
Dept: LAB | Facility: CLINIC | Age: 63
End: 2023-12-18
Payer: COMMERCIAL

## 2023-12-18 DIAGNOSIS — E78.00 HIGH CHOLESTEROL: ICD-10-CM

## 2023-12-18 LAB
ALBUMIN SERPL BCG-MCNC: 4.3 G/DL (ref 3.5–5.2)
ALP SERPL-CCNC: 109 U/L (ref 40–150)
ALT SERPL W P-5'-P-CCNC: 14 U/L (ref 0–50)
ANION GAP SERPL CALCULATED.3IONS-SCNC: 9 MMOL/L (ref 7–15)
AST SERPL W P-5'-P-CCNC: 18 U/L (ref 0–45)
BILIRUB SERPL-MCNC: 0.3 MG/DL
BUN SERPL-MCNC: 9.8 MG/DL (ref 8–23)
CALCIUM SERPL-MCNC: 9.7 MG/DL (ref 8.8–10.2)
CHLORIDE SERPL-SCNC: 101 MMOL/L (ref 98–107)
CHOLEST SERPL-MCNC: 140 MG/DL
CREAT SERPL-MCNC: 0.8 MG/DL (ref 0.51–0.95)
DEPRECATED HCO3 PLAS-SCNC: 27 MMOL/L (ref 22–29)
EGFRCR SERPLBLD CKD-EPI 2021: 82 ML/MIN/1.73M2
FASTING STATUS PATIENT QL REPORTED: YES
GLUCOSE SERPL-MCNC: 99 MG/DL (ref 70–99)
HDLC SERPL-MCNC: 45 MG/DL
LDLC SERPL CALC-MCNC: 73 MG/DL
NONHDLC SERPL-MCNC: 95 MG/DL
POTASSIUM SERPL-SCNC: 4.2 MMOL/L (ref 3.4–5.3)
PROT SERPL-MCNC: 6.8 G/DL (ref 6.4–8.3)
SODIUM SERPL-SCNC: 137 MMOL/L (ref 135–145)
TRIGL SERPL-MCNC: 112 MG/DL

## 2023-12-18 PROCEDURE — 80053 COMPREHEN METABOLIC PANEL: CPT

## 2023-12-18 PROCEDURE — 36415 COLL VENOUS BLD VENIPUNCTURE: CPT

## 2023-12-18 PROCEDURE — 80061 LIPID PANEL: CPT

## 2024-01-02 DIAGNOSIS — I10 ESSENTIAL HYPERTENSION: ICD-10-CM

## 2024-01-02 RX ORDER — LISINOPRIL 5 MG/1
5 TABLET ORAL DAILY
Qty: 90 TABLET | Refills: 2 | Status: SHIPPED | OUTPATIENT
Start: 2024-01-02 | End: 2024-09-18

## 2024-02-16 ENCOUNTER — MYC MEDICAL ADVICE (OUTPATIENT)
Dept: FAMILY MEDICINE | Facility: CLINIC | Age: 64
End: 2024-02-16
Payer: COMMERCIAL

## 2024-02-22 ENCOUNTER — TRANSFERRED RECORDS (OUTPATIENT)
Dept: HEALTH INFORMATION MANAGEMENT | Facility: CLINIC | Age: 64
End: 2024-02-22
Payer: COMMERCIAL

## 2024-04-21 ENCOUNTER — HEALTH MAINTENANCE LETTER (OUTPATIENT)
Age: 64
End: 2024-04-21

## 2024-05-01 ENCOUNTER — PATIENT OUTREACH (OUTPATIENT)
Dept: FAMILY MEDICINE | Facility: CLINIC | Age: 64
End: 2024-05-01
Payer: COMMERCIAL

## 2024-05-01 NOTE — TELEPHONE ENCOUNTER
Patient Quality Outreach    Patient is due for the following:   Physical Preventive Adult Physical      Topic Date Due    COVID-19 Vaccine (6 - 2023-24 season) 09/01/2023    Diptheria Tetanus Pertussis (DTAP/TDAP/TD) Vaccine (2 - Td or Tdap) 04/08/2024       Next Steps:   Schedule a Adult Preventative    Type of outreach:    Sent [employer] message.      Questions for provider review:    None           SHAYLEE SCHRADER MA

## 2024-05-10 ENCOUNTER — OFFICE VISIT (OUTPATIENT)
Dept: URGENT CARE | Facility: URGENT CARE | Age: 64
End: 2024-05-10
Payer: COMMERCIAL

## 2024-05-10 VITALS
RESPIRATION RATE: 22 BRPM | WEIGHT: 172 LBS | HEART RATE: 71 BPM | TEMPERATURE: 97.6 F | BODY MASS INDEX: 29.52 KG/M2 | OXYGEN SATURATION: 97 % | SYSTOLIC BLOOD PRESSURE: 144 MMHG | DIASTOLIC BLOOD PRESSURE: 80 MMHG

## 2024-05-10 DIAGNOSIS — L08.9 INFECTED SEBACEOUS CYST OF SKIN: Primary | ICD-10-CM

## 2024-05-10 DIAGNOSIS — L72.3 INFECTED SEBACEOUS CYST OF SKIN: Primary | ICD-10-CM

## 2024-05-10 PROCEDURE — 99214 OFFICE O/P EST MOD 30 MIN: CPT | Performed by: FAMILY MEDICINE

## 2024-05-10 RX ORDER — DOXYCYCLINE HYCLATE 100 MG
100 TABLET ORAL 2 TIMES DAILY
Qty: 20 TABLET | Refills: 0 | Status: SHIPPED | OUTPATIENT
Start: 2024-05-10 | End: 2024-05-20

## 2024-05-10 RX ORDER — MUPIROCIN 20 MG/G
OINTMENT TOPICAL 3 TIMES DAILY
Qty: 22 G | Refills: 0 | Status: SHIPPED | OUTPATIENT
Start: 2024-05-10 | End: 2024-05-29

## 2024-05-10 NOTE — PROGRESS NOTES
ASSESSMENT/PLAN:      ICD-10-CM    1. Infected sebaceous cyst of skin  L72.3 doxycycline hyclate (VIBRA-TABS) 100 MG tablet    L08.9 mupirocin (BACTROBAN) 2 % external ointment          Patient Instructions     Start doxycycline 2 times a day for 10 days always take with food to prevent nausea vomiting     Start bactroban ointment  (mupircion ointment) am and bedtime and any time change bandaid    Stop neosporin (triple antibiotic) to area -ingredients that cause the skin to turn red   Use over the counter bacitracin or generic     Fever, increase redness, increase pain to ER     Keep follow up appointment with Dr. Cerda                     Reviewed medication instructions and side effects. Follow up if experiences side effects.     I reviewed supportive care, otc meds to use if needed, expected course, and signs of concern.  Follow up as needed or if she does not improve within  1-2 days or if worsens in any way.  Reviewed red flag symptoms and is to go to the ER if experiences any of these.     The use of Dragon/[employer] dictation services may have been used to construct the content in this note; any grammatical or spelling errors are non-intentional. Please contact the author of this note directly if you are in need of any clarification.                  Patient presents with:  Urgent Care: c/o red cyst/wound under left breast at bra line since Wednesday.        Subjective     Kirstin Simmons is a 63 year old female who presents to clinic today for the following health issues:    HPI     Skin cyst under left breast in the fold of the breast tissue-3 days ago she had onset of swelling and tenderness mild redness of the area,   pain progressively worsened over the next 2 days   yesterday the cyst opened and drained some pus and thick white discharge, and    pain in the area nearly resolved  Today, only tender to the touch, redness and area of the cyst has persisted, but has not increased, no streaking , no longer  having drainage,     Cyst has been present for several years greater than at least 5 years noted at the time of her preventative visit -she had a flesh-colored cyst-did not require imaging / mammogram  patient recalls being told it was a sebaceous cyst         Past Medical History:   Diagnosis Date    Abdominal or pelvic swelling, mass or lump, unspecified site     Abnormal glandular Papanicolaou smear of cervix     Abnormal Pap smear 2006    Arthritis     Backache, unspecified     Cancer (H)     Grandmother, mother    Congestive heart failure (H)     Grandmother    Insomnia, unspecified     Other specified trigeminal nerve disorders     Thoracic or lumbosacral neuritis or radiculitis, unspecified     Thyroid disease     Daughter    Unspecified disorder of menstruation and other abnormal bleeding from female genital tract     Unspecified symptom associated with female genital organs     Viral warts, unspecified      Social History     Tobacco Use    Smoking status: Every Day     Current packs/day: 0.00     Average packs/day: 1 pack/day for 2.0 years (2.0 ttl pk-yrs)     Types: Cigarettes     Start date: 10/15/2018     Last attempt to quit: 2014     Years since quittin.8    Smokeless tobacco: Never    Tobacco comments:     Was a former smoker, non smoker, for 5 years.   Substance Use Topics    Alcohol use: Yes     Alcohol/week: 2.5 - 3.3 standard drinks of alcohol     Comment: 5 x a month       Current Outpatient Medications   Medication Sig Dispense Refill    aspirin 81 MG tablet Take 1 tablet (81 mg) by mouth daily 90 tablet 3    atorvastatin (LIPITOR) 20 MG tablet Take 1 tablet by mouth once daily 90 tablet 2    cetirizine (ZYRTEC) 10 MG tablet Take 1 tablet by mouth daily. 90 tablet 3    doxycycline hyclate (VIBRA-TABS) 100 MG tablet Take 1 tablet (100 mg) by mouth 2 times daily for 10 days 20 tablet 0    IBUPROFEN PO Take 2 tablets by mouth daily      lisinopril (ZESTRIL) 5 MG tablet Take 1 tablet  by mouth once daily 90 tablet 2    mupirocin (BACTROBAN) 2 % external ointment Apply topically 3 times daily 22 g 0     Allergies   Allergen Reactions    Pcn [Penicillins] Hives    Ceclor [Cefaclor] Hives    Flonase [Fluticasone] Nausea    Latex     Morphine GI Disturbance    Olopatadine Other (See Comments)     Swelling     Omnicef [Cefdinir] Nausea and Vomiting    Perfume     Eggs GI Disturbance             ROS are negative, except as otherwise noted HPI      Objective    BP (!) 144/80   Pulse 71   Temp 97.6  F (36.4  C) (Tympanic)   Resp 22   Wt 78 kg (172 lb)   SpO2 97%   BMI 29.52 kg/m    Body mass index is 29.52 kg/m .  Physical Exam   GENERAL: alert and no distress  SKIN: Left breast-fold under breast tissue-mid position in the of the fold, 0.5 oval lesion with small amount of mucoid discharge, no drainage minimally tender to palpation, no induration, no fluctuance 0.1 centimeter wide band encircling  the lesion NEURO: Normal strength and tone, mentation intact and speech normal      Diagnostic Test Results:  Labs reviewed in Epic  No results found for any visits on 05/10/24.

## 2024-05-10 NOTE — PATIENT INSTRUCTIONS
Start doxycycline 2 times a day for 10 days always take with food to prevent nausea vomiting     Start bactroban ointment  (mupircion ointment) am and bedtime and any time change bandaid    Stop neosporin (triple antibiotic) to area -ingredients that cause the skin to turn red   Use over the counter bacitracin or generic     Fever, increase redness, increase pain to ER     Keep follow up appointment with Dr. Cerda

## 2024-05-29 ENCOUNTER — OFFICE VISIT (OUTPATIENT)
Dept: FAMILY MEDICINE | Facility: CLINIC | Age: 64
End: 2024-05-29
Payer: COMMERCIAL

## 2024-05-29 VITALS
SYSTOLIC BLOOD PRESSURE: 139 MMHG | WEIGHT: 173.6 LBS | DIASTOLIC BLOOD PRESSURE: 72 MMHG | TEMPERATURE: 97.1 F | HEART RATE: 76 BPM | OXYGEN SATURATION: 97 % | RESPIRATION RATE: 20 BRPM | BODY MASS INDEX: 29.64 KG/M2 | HEIGHT: 64 IN

## 2024-05-29 DIAGNOSIS — L25.9 CONTACT DERMATITIS, UNSPECIFIED CONTACT DERMATITIS TYPE, UNSPECIFIED TRIGGER: ICD-10-CM

## 2024-05-29 DIAGNOSIS — B37.0 THRUSH: Primary | ICD-10-CM

## 2024-05-29 PROCEDURE — 99214 OFFICE O/P EST MOD 30 MIN: CPT | Performed by: FAMILY MEDICINE

## 2024-05-29 RX ORDER — FLUCONAZOLE 150 MG/1
150 TABLET ORAL
Qty: 3 TABLET | Refills: 0 | Status: SHIPPED | OUTPATIENT
Start: 2024-05-29 | End: 2024-06-05

## 2024-05-29 RX ORDER — TRIAMCINOLONE ACETONIDE 1 MG/G
CREAM TOPICAL 2 TIMES DAILY
Qty: 45 G | Refills: 1 | Status: SHIPPED | OUTPATIENT
Start: 2024-05-29

## 2024-05-29 ASSESSMENT — PAIN SCALES - GENERAL: PAINLEVEL: NO PAIN (0)

## 2024-05-29 NOTE — PROGRESS NOTES
Subjective   Kirstin is a 63 year old, presenting for the following health issues:  Derm Problem        5/29/2024     1:11 PM   Additional Questions   Roomed by Libby   Accompanied by SUMAYA     History of Present Illness       Reason for visit:  Follow up on infection under left breast. Also to review meds and get new labs for medication renewal    She eats 0-1 servings of fruits and vegetables daily.She consumes 2 sweetened beverage(s) daily.She exercises with enough effort to increase her heart rate 9 or less minutes per day.  She exercises with enough effort to increase her heart rate 3 or less days per week.   She is taking medications regularly.   SUBJECTIVE:  63 year old.The patient has a complaint of tongue pain.  This started after being on antibiotics.  quality stings.   Elbow dry skin for months. Better with lotion  Reviewed health maintenance  Patient Active Problem List   Diagnosis    Abnormal Pap smear    Menopause    S/P oophorectomy    Lumbar radiculopathy    Chronic rhinitis    Skin tag    Benign neoplasm of skin of trunk, except scrotum    Backache    Dry eye syndrome    High cholesterol    Skin lesion    Advanced directives, counseling/discussion    BMI 34.0-34.9,adult     Past Medical History:   Diagnosis Date    Abdominal or pelvic swelling, mass or lump, unspecified site     Abnormal glandular Papanicolaou smear of cervix     Abnormal Pap smear 09/06/2006    Arthritis     Backache, unspecified     Cancer (H)     Grandmother, mother    Congestive heart failure (H)     Grandmother    Insomnia, unspecified     Other specified trigeminal nerve disorders     Thoracic or lumbosacral neuritis or radiculitis, unspecified     Thyroid disease     Daughter    Unspecified disorder of menstruation and other abnormal bleeding from female genital tract     Unspecified symptom associated with female genital organs     Viral warts, unspecified        OBJECTIVE:  no apparent distress  /72   Pulse 76    "Temp 97.1  F (36.2  C) (Tympanic)   Resp 20   Ht 1.626 m (5' 4.02\")   Wt 78.7 kg (173 lb 9.6 oz)   SpO2 97%   BMI 29.78 kg/m    Tongue is markedly red.       ICD-10-CM    1. Thrush  B37.0 fluconazole (DIFLUCAN) 150 MG tablet      2. Contact dermatitis, unspecified contact dermatitis type, unspecified trigger  L25.9 triamcinolone (KENALOG) 0.1 % external cream       PLAN: Follow up in 6 months       "

## 2024-06-25 DIAGNOSIS — E78.00 HIGH CHOLESTEROL: ICD-10-CM

## 2024-06-25 RX ORDER — ATORVASTATIN CALCIUM 20 MG/1
20 TABLET, FILM COATED ORAL DAILY
Qty: 90 TABLET | Refills: 1 | Status: SHIPPED | OUTPATIENT
Start: 2024-06-25

## 2024-09-18 DIAGNOSIS — I10 ESSENTIAL HYPERTENSION: ICD-10-CM

## 2024-09-18 RX ORDER — LISINOPRIL 5 MG/1
5 TABLET ORAL DAILY
Qty: 90 TABLET | Refills: 2 | Status: SHIPPED | OUTPATIENT
Start: 2024-09-18

## 2024-11-17 ENCOUNTER — HEALTH MAINTENANCE LETTER (OUTPATIENT)
Age: 64
End: 2024-11-17

## 2024-12-16 DIAGNOSIS — E78.00 HIGH CHOLESTEROL: ICD-10-CM

## 2024-12-16 RX ORDER — ATORVASTATIN CALCIUM 20 MG/1
20 TABLET, FILM COATED ORAL DAILY
Qty: 90 TABLET | Refills: 0 | Status: SHIPPED | OUTPATIENT
Start: 2024-12-16

## 2025-01-08 ENCOUNTER — OFFICE VISIT (OUTPATIENT)
Dept: FAMILY MEDICINE | Facility: CLINIC | Age: 65
End: 2025-01-08
Payer: COMMERCIAL

## 2025-01-08 VITALS
BODY MASS INDEX: 28.68 KG/M2 | SYSTOLIC BLOOD PRESSURE: 136 MMHG | WEIGHT: 168 LBS | RESPIRATION RATE: 16 BRPM | TEMPERATURE: 97.5 F | OXYGEN SATURATION: 99 % | DIASTOLIC BLOOD PRESSURE: 89 MMHG | HEART RATE: 62 BPM | HEIGHT: 64 IN

## 2025-01-08 DIAGNOSIS — E78.00 HIGH CHOLESTEROL: ICD-10-CM

## 2025-01-08 DIAGNOSIS — Z78.0 ASYMPTOMATIC POSTMENOPAUSAL STATUS: Primary | ICD-10-CM

## 2025-01-08 DIAGNOSIS — Z13.820 SCREENING FOR OSTEOPOROSIS: ICD-10-CM

## 2025-01-08 DIAGNOSIS — E27.8 LEFT ADRENAL MASS: ICD-10-CM

## 2025-01-08 DIAGNOSIS — I10 ESSENTIAL HYPERTENSION: ICD-10-CM

## 2025-01-08 DIAGNOSIS — Z12.2 SCREENING FOR LUNG CANCER: ICD-10-CM

## 2025-01-08 PROCEDURE — 36415 COLL VENOUS BLD VENIPUNCTURE: CPT | Performed by: FAMILY MEDICINE

## 2025-01-08 PROCEDURE — 80061 LIPID PANEL: CPT | Performed by: FAMILY MEDICINE

## 2025-01-08 PROCEDURE — 90471 IMMUNIZATION ADMIN: CPT | Performed by: FAMILY MEDICINE

## 2025-01-08 PROCEDURE — 90662 IIV NO PRSV INCREASED AG IM: CPT | Performed by: FAMILY MEDICINE

## 2025-01-08 PROCEDURE — 80053 COMPREHEN METABOLIC PANEL: CPT | Performed by: FAMILY MEDICINE

## 2025-01-08 PROCEDURE — G2211 COMPLEX E/M VISIT ADD ON: HCPCS | Performed by: FAMILY MEDICINE

## 2025-01-08 PROCEDURE — 99214 OFFICE O/P EST MOD 30 MIN: CPT | Mod: 25 | Performed by: FAMILY MEDICINE

## 2025-01-08 RX ORDER — LISINOPRIL 5 MG/1
5 TABLET ORAL DAILY
Qty: 90 TABLET | Refills: 3 | Status: SHIPPED | OUTPATIENT
Start: 2025-01-08

## 2025-01-08 RX ORDER — ATORVASTATIN CALCIUM 20 MG/1
20 TABLET, FILM COATED ORAL DAILY
Qty: 90 TABLET | Refills: 3 | Status: SHIPPED | OUTPATIENT
Start: 2025-01-08

## 2025-01-08 ASSESSMENT — PAIN SCALES - GENERAL: PAINLEVEL_OUTOF10: MODERATE PAIN (4)

## 2025-01-08 NOTE — PROGRESS NOTES
ICD-10-CM    1. Asymptomatic postmenopausal status  Z78.0 DEXA HIP/PELVIS/SPINE - Future      2. High cholesterol  E78.00 atorvastatin (LIPITOR) 20 MG tablet     Comprehensive metabolic panel (BMP + Alb, Alk Phos, ALT, AST, Total. Bili, TP)     Lipid panel reflex to direct LDL Fasting      3. Essential hypertension  I10 lisinopril (ZESTRIL) 5 MG tablet     Comprehensive metabolic panel (BMP + Alb, Alk Phos, ALT, AST, Total. Bili, TP)     Lipid panel reflex to direct LDL Fasting      4. Screening for osteoporosis  Z13.820 DEXA HIP/PELVIS/SPINE - Future      5. Left adrenal mass  E27.8 MR Adrenal wo and w Contrast      6. Screening for lung cancer  Z12.2 CT Chest Lung Cancer Screen Low Dose Without       PLAN:Follow up in 1 year      Subjective   Kirstin is a 64 year old, presenting for the following health issues:  Recheck Medication, Jaw Pain, and Back Pain        1/8/2025     9:48 AM   Additional Questions   Roomed by Vero     History of Present Illness       Back Pain:  She presents for follow up of back pain. Patient's back pain is a chronic problem.  Location of back pain:  Right lower back, left lower back, right buttock, left buttock, right hip and left hip  Description of back pain: sharp, shooting and stabbing  Back pain spreads: right buttocks and left buttocks    Since patient first noticed back pain, pain is: always present, but gets better and worse  Does back pain interfere with her job:  Not applicable       Hyperlipidemia:  She presents for follow up of hyperlipidemia.   She is taking medication to lower cholesterol. She is not having myalgia or other side effects to statin medications.    Hypertension: She presents for follow up of hypertension.  She does not check blood pressure  regularly outside of the clinic. Outpatient blood pressures have not been over 140/90. She does not follow a low salt diet.     She eats 0-1 servings of fruits and vegetables daily.She consumes 2 sweetened beverage(s)  daily.She exercises with enough effort to increase her heart rate 9 or less minutes per day.  She exercises with enough effort to increase her heart rate 3 or less days per week.   She is taking medications regularly.     Would like to talk about getting a handicap parking sticker due to ongoing back issues     Needs CT and MRI orders    SUBJECTIVE:  64 year oldyear old female enters with  hypertension.  Pt. Has been compliant with medications and medications were reviewed.  No side effects. No chest pain or sob. Low sodium diet.    Current Outpatient Medications:     aspirin 81 MG tablet, Take 1 tablet (81 mg) by mouth daily, Disp: 90 tablet, Rfl: 3    atorvastatin (LIPITOR) 20 MG tablet, Take 1 tablet (20 mg) by mouth daily., Disp: 90 tablet, Rfl: 3    cetirizine (ZYRTEC) 10 MG tablet, Take 1 tablet by mouth daily., Disp: 90 tablet, Rfl: 3    IBUPROFEN PO, Take 2 tablets by mouth daily, Disp: , Rfl:     lisinopril (ZESTRIL) 5 MG tablet, Take 1 tablet (5 mg) by mouth daily., Disp: 90 tablet, Rfl: 3  Past Medical History:   Diagnosis Date    Abdominal or pelvic swelling, mass or lump, unspecified site     Abnormal glandular Papanicolaou smear of cervix     Abnormal Pap smear 09/06/2006    Arthritis     Backache, unspecified     Cancer (H)     Grandmother, mother    Congestive heart failure (H)     Grandmother    Insomnia, unspecified     Other specified trigeminal nerve disorders     Thoracic or lumbosacral neuritis or radiculitis, unspecified     Thyroid disease     Daughter    Unspecified disorder of menstruation and other abnormal bleeding from female genital tract     Unspecified symptom associated with female genital organs     Viral warts, unspecified      Lab on 12/18/2023   Component Date Value Ref Range Status    Cholesterol 12/18/2023 140  <200 mg/dL Final    Triglycerides 12/18/2023 112  <150 mg/dL Final    Direct Measure HDL 12/18/2023 45 (L)  >=50 mg/dL Final    LDL Cholesterol Calculated 12/18/2023 73   <=100 mg/dL Final    Non HDL Cholesterol 12/18/2023 95  <130 mg/dL Final    Patient Fasting > 8hrs? 12/18/2023 Yes   Final    Sodium 12/18/2023 137  135 - 145 mmol/L Final    Reference intervals for this test were updated on 09/26/2023 to more accurately reflect our healthy population. There may be differences in the flagging of prior results with similar values performed with this method. Interpretation of those prior results can be made in the context of the updated reference intervals.     Potassium 12/18/2023 4.2  3.4 - 5.3 mmol/L Final    Carbon Dioxide (CO2) 12/18/2023 27  22 - 29 mmol/L Final    Anion Gap 12/18/2023 9  7 - 15 mmol/L Final    Urea Nitrogen 12/18/2023 9.8  8.0 - 23.0 mg/dL Final    Creatinine 12/18/2023 0.80  0.51 - 0.95 mg/dL Final    GFR Estimate 12/18/2023 82  >60 mL/min/1.73m2 Final    Calcium 12/18/2023 9.7  8.8 - 10.2 mg/dL Final    Chloride 12/18/2023 101  98 - 107 mmol/L Final    Glucose 12/18/2023 99  70 - 99 mg/dL Final    Alkaline Phosphatase 12/18/2023 109  40 - 150 U/L Final    Reference intervals for this test were updated on 11/14/2023 to more accurately reflect our healthy population. There may be differences in the flagging of prior results with similar values performed with this method. Interpretation of those prior results can be made in the context of the updated reference intervals.    AST 12/18/2023 18  0 - 45 U/L Final    Reference intervals for this test were updated on 6/12/2023 to more accurately reflect our healthy population. There may be differences in the flagging of prior results with similar values performed with this method. Interpretation of those prior results can be made in the context of the updated reference intervals.    ALT 12/18/2023 14  0 - 50 U/L Final    Reference intervals for this test were updated on 6/12/2023 to more accurately reflect our healthy population. There may be differences in the flagging of prior results with similar values performed  "with this method. Interpretation of those prior results can be made in the context of the updated reference intervals.      Protein Total 12/18/2023 6.8  6.4 - 8.3 g/dL Final    Albumin 12/18/2023 4.3  3.5 - 5.2 g/dL Final    Bilirubin Total 12/18/2023 0.3  <=1.2 mg/dL Final      Reviewed health maintenance  Patient Active Problem List   Diagnosis    Abnormal Pap smear    Menopause    S/P oophorectomy    Lumbar radiculopathy    Chronic rhinitis    Skin tag    Benign neoplasm of skin of trunk, except scrotum    Backache    Dry eye syndrome    High cholesterol    Skin lesion    BMI 34.0-34.9,adult         OBJECTIVE:  no apparent distress  /89   Pulse 62   Temp 97.5  F (36.4  C) (Tympanic)   Resp 16   Ht 1.626 m (5' 4\")   Wt 76.2 kg (168 lb)   SpO2 99%   BMI 28.84 kg/m       Head: Normocephalic. No masses, lesions, tenderness or abnormalities.  Neck::Neck supple. No adenopathy. Thyroid symmetric, normal size.    Cardiovascular: negative. No lifts, heaves, or thrills. RRR. No murmurs, clicks gallops or rubs  Respiratory. Good diaphragmatic excursion. Lungs clear  Gastrointestinal:Abdomen soft, non-tender.  No masses, organomegaly    Lab on 12/18/2023   Component Date Value Ref Range Status    Cholesterol 12/18/2023 140  <200 mg/dL Final    Triglycerides 12/18/2023 112  <150 mg/dL Final    Direct Measure HDL 12/18/2023 45 (L)  >=50 mg/dL Final    LDL Cholesterol Calculated 12/18/2023 73  <=100 mg/dL Final    Non HDL Cholesterol 12/18/2023 95  <130 mg/dL Final    Patient Fasting > 8hrs? 12/18/2023 Yes   Final    Sodium 12/18/2023 137  135 - 145 mmol/L Final    Reference intervals for this test were updated on 09/26/2023 to more accurately reflect our healthy population. There may be differences in the flagging of prior results with similar values performed with this method. Interpretation of those prior results can be made in the context of the updated reference intervals.     Potassium 12/18/2023 4.2  3.4 - " 5.3 mmol/L Final    Carbon Dioxide (CO2) 12/18/2023 27  22 - 29 mmol/L Final    Anion Gap 12/18/2023 9  7 - 15 mmol/L Final    Urea Nitrogen 12/18/2023 9.8  8.0 - 23.0 mg/dL Final    Creatinine 12/18/2023 0.80  0.51 - 0.95 mg/dL Final    GFR Estimate 12/18/2023 82  >60 mL/min/1.73m2 Final    Calcium 12/18/2023 9.7  8.8 - 10.2 mg/dL Final    Chloride 12/18/2023 101  98 - 107 mmol/L Final    Glucose 12/18/2023 99  70 - 99 mg/dL Final    Alkaline Phosphatase 12/18/2023 109  40 - 150 U/L Final    Reference intervals for this test were updated on 11/14/2023 to more accurately reflect our healthy population. There may be differences in the flagging of prior results with similar values performed with this method. Interpretation of those prior results can be made in the context of the updated reference intervals.    AST 12/18/2023 18  0 - 45 U/L Final    Reference intervals for this test were updated on 6/12/2023 to more accurately reflect our healthy population. There may be differences in the flagging of prior results with similar values performed with this method. Interpretation of those prior results can be made in the context of the updated reference intervals.    ALT 12/18/2023 14  0 - 50 U/L Final    Reference intervals for this test were updated on 6/12/2023 to more accurately reflect our healthy population. There may be differences in the flagging of prior results with similar values performed with this method. Interpretation of those prior results can be made in the context of the updated reference intervals.      Protein Total 12/18/2023 6.8  6.4 - 8.3 g/dL Final    Albumin 12/18/2023 4.3  3.5 - 5.2 g/dL Final    Bilirubin Total 12/18/2023 0.3  <=1.2 mg/dL Final             Signed Electronically by: Rigo Cerda MD

## 2025-01-09 ENCOUNTER — MYC MEDICAL ADVICE (OUTPATIENT)
Dept: FAMILY MEDICINE | Facility: CLINIC | Age: 65
End: 2025-01-09
Payer: COMMERCIAL

## 2025-01-09 ENCOUNTER — PATIENT OUTREACH (OUTPATIENT)
Dept: CARE COORDINATION | Facility: CLINIC | Age: 65
End: 2025-01-09
Payer: COMMERCIAL

## 2025-01-09 LAB
ALBUMIN SERPL BCG-MCNC: 4.2 G/DL (ref 3.5–5.2)
ALP SERPL-CCNC: 103 U/L (ref 40–150)
ALT SERPL W P-5'-P-CCNC: 16 U/L (ref 0–50)
ANION GAP SERPL CALCULATED.3IONS-SCNC: 10 MMOL/L (ref 7–15)
AST SERPL W P-5'-P-CCNC: 18 U/L (ref 0–45)
BILIRUB SERPL-MCNC: 0.3 MG/DL
BUN SERPL-MCNC: 8.7 MG/DL (ref 8–23)
CALCIUM SERPL-MCNC: 9.3 MG/DL (ref 8.8–10.4)
CHLORIDE SERPL-SCNC: 104 MMOL/L (ref 98–107)
CHOLEST SERPL-MCNC: 140 MG/DL
CREAT SERPL-MCNC: 0.77 MG/DL (ref 0.51–0.95)
EGFRCR SERPLBLD CKD-EPI 2021: 85 ML/MIN/1.73M2
FASTING STATUS PATIENT QL REPORTED: YES
FASTING STATUS PATIENT QL REPORTED: YES
GLUCOSE SERPL-MCNC: 88 MG/DL (ref 70–99)
HCO3 SERPL-SCNC: 26 MMOL/L (ref 22–29)
HDLC SERPL-MCNC: 43 MG/DL
LDLC SERPL CALC-MCNC: 74 MG/DL
NONHDLC SERPL-MCNC: 97 MG/DL
POTASSIUM SERPL-SCNC: 4.1 MMOL/L (ref 3.4–5.3)
PROT SERPL-MCNC: 6.6 G/DL (ref 6.4–8.3)
SODIUM SERPL-SCNC: 140 MMOL/L (ref 135–145)
TRIGL SERPL-MCNC: 113 MG/DL

## 2025-01-09 NOTE — TELEPHONE ENCOUNTER
Was the dose supposed to be increased from 20 mg? I see you sent lipitor 20 mg yesterday.Lynda Benz [facility]

## 2025-01-17 ENCOUNTER — ANCILLARY PROCEDURE (OUTPATIENT)
Dept: CT IMAGING | Facility: CLINIC | Age: 65
End: 2025-01-17
Attending: FAMILY MEDICINE
Payer: COMMERCIAL

## 2025-01-17 DIAGNOSIS — Z12.2 SCREENING FOR LUNG CANCER: ICD-10-CM

## 2025-01-17 PROCEDURE — 71271 CT THORAX LUNG CANCER SCR C-: CPT | Mod: TC | Performed by: RADIOLOGY

## 2025-01-21 ENCOUNTER — ANCILLARY PROCEDURE (OUTPATIENT)
Dept: MRI IMAGING | Facility: CLINIC | Age: 65
End: 2025-01-21
Attending: FAMILY MEDICINE
Payer: COMMERCIAL

## 2025-01-21 DIAGNOSIS — E27.8 LEFT ADRENAL MASS: ICD-10-CM

## 2025-01-21 PROCEDURE — A9585 GADOBUTROL INJECTION: HCPCS | Mod: JZ | Performed by: RADIOLOGY

## 2025-01-21 PROCEDURE — 74183 MRI ABD W/O CNTR FLWD CNTR: CPT | Mod: TC | Performed by: RADIOLOGY

## 2025-01-21 RX ORDER — GADOBUTROL 604.72 MG/ML
7.5 INJECTION INTRAVENOUS ONCE
Status: COMPLETED | OUTPATIENT
Start: 2025-01-21 | End: 2025-01-21

## 2025-01-21 RX ADMIN — GADOBUTROL 7.5 ML: 604.72 INJECTION INTRAVENOUS at 12:00

## 2025-01-22 DIAGNOSIS — E04.1 THYROID CYST: ICD-10-CM

## 2025-01-22 DIAGNOSIS — J21.9 BRONCHIOLITIS: Primary | ICD-10-CM

## 2025-01-27 ENCOUNTER — MYC MEDICAL ADVICE (OUTPATIENT)
Dept: FAMILY MEDICINE | Facility: CLINIC | Age: 65
End: 2025-01-27
Payer: COMMERCIAL

## 2025-01-28 ENCOUNTER — MYC MEDICAL ADVICE (OUTPATIENT)
Dept: FAMILY MEDICINE | Facility: CLINIC | Age: 65
End: 2025-01-28

## 2025-02-05 ENCOUNTER — ANCILLARY PROCEDURE (OUTPATIENT)
Dept: ULTRASOUND IMAGING | Facility: CLINIC | Age: 65
End: 2025-02-05
Attending: FAMILY MEDICINE
Payer: COMMERCIAL

## 2025-02-05 DIAGNOSIS — E04.1 THYROID CYST: ICD-10-CM

## 2025-02-05 PROCEDURE — 76536 US EXAM OF HEAD AND NECK: CPT | Mod: TC | Performed by: STUDENT IN AN ORGANIZED HEALTH CARE EDUCATION/TRAINING PROGRAM

## 2025-02-19 ENCOUNTER — MYC MEDICAL ADVICE (OUTPATIENT)
Dept: FAMILY MEDICINE | Facility: CLINIC | Age: 65
End: 2025-02-19
Payer: COMMERCIAL

## 2025-03-08 ENCOUNTER — MYC REFILL (OUTPATIENT)
Dept: FAMILY MEDICINE | Facility: CLINIC | Age: 65
End: 2025-03-08
Payer: COMMERCIAL

## 2025-03-09 RX ORDER — MONTELUKAST SODIUM 10 MG/1
10 TABLET ORAL AT BEDTIME
Qty: 30 TABLET | Refills: 0 | Status: SHIPPED | OUTPATIENT
Start: 2025-03-09

## 2025-03-12 ENCOUNTER — LAB (OUTPATIENT)
Dept: LAB | Facility: CLINIC | Age: 65
End: 2025-03-12
Attending: FAMILY MEDICINE
Payer: COMMERCIAL

## 2025-03-12 ENCOUNTER — E-VISIT (OUTPATIENT)
Dept: FAMILY MEDICINE | Facility: CLINIC | Age: 65
End: 2025-03-12
Payer: COMMERCIAL

## 2025-03-12 ENCOUNTER — MYC MEDICAL ADVICE (OUTPATIENT)
Dept: FAMILY MEDICINE | Facility: CLINIC | Age: 65
End: 2025-03-12
Payer: COMMERCIAL

## 2025-03-12 DIAGNOSIS — R07.0 THROAT PAIN: ICD-10-CM

## 2025-03-12 DIAGNOSIS — R07.0 THROAT PAIN: Primary | ICD-10-CM

## 2025-03-12 LAB
DEPRECATED S PYO AG THROAT QL EIA: NEGATIVE
S PYO DNA THROAT QL NAA+PROBE: NOT DETECTED

## 2025-03-12 PROCEDURE — 87651 STREP A DNA AMP PROBE: CPT

## 2025-03-26 ENCOUNTER — PATIENT OUTREACH (OUTPATIENT)
Dept: FAMILY MEDICINE | Facility: CLINIC | Age: 65
End: 2025-03-26
Payer: COMMERCIAL

## 2025-03-26 NOTE — TELEPHONE ENCOUNTER
Patient Quality Outreach    Patient is due for the following:   Physical Annual Wellness Visit    Action(s) Taken:   Schedule a Annual Wellness Visit    Type of outreach:    Sent [employer] message.    Questions for provider review:    None         Katina Urbina MA  Chart routed to .

## 2025-04-08 ENCOUNTER — MYC MEDICAL ADVICE (OUTPATIENT)
Dept: FAMILY MEDICINE | Facility: CLINIC | Age: 65
End: 2025-04-08
Payer: COMMERCIAL

## 2025-04-09 ENCOUNTER — MYC REFILL (OUTPATIENT)
Dept: FAMILY MEDICINE | Facility: CLINIC | Age: 65
End: 2025-04-09
Payer: COMMERCIAL

## 2025-04-10 RX ORDER — MONTELUKAST SODIUM 10 MG/1
10 TABLET ORAL AT BEDTIME
Qty: 30 TABLET | Refills: 0 | Status: SHIPPED | OUTPATIENT
Start: 2025-04-10

## 2025-04-10 RX ORDER — MONTELUKAST SODIUM 10 MG/1
10 TABLET ORAL AT BEDTIME
Qty: 90 TABLET | Refills: 2 | Status: SHIPPED | OUTPATIENT
Start: 2025-04-10

## 2025-04-13 ENCOUNTER — PATIENT OUTREACH (OUTPATIENT)
Dept: CARE COORDINATION | Facility: CLINIC | Age: 65
End: 2025-04-13
Payer: COMMERCIAL

## 2025-04-16 ENCOUNTER — MYC REFILL (OUTPATIENT)
Dept: FAMILY MEDICINE | Facility: CLINIC | Age: 65
End: 2025-04-16
Payer: COMMERCIAL

## 2025-04-16 DIAGNOSIS — E78.00 HIGH CHOLESTEROL: ICD-10-CM

## 2025-04-16 DIAGNOSIS — I10 ESSENTIAL HYPERTENSION: ICD-10-CM

## 2025-04-16 RX ORDER — LISINOPRIL 5 MG/1
5 TABLET ORAL DAILY
Qty: 90 TABLET | Refills: 3 | OUTPATIENT
Start: 2025-04-16

## 2025-04-16 RX ORDER — ATORVASTATIN CALCIUM 20 MG/1
20 TABLET, FILM COATED ORAL DAILY
Qty: 90 TABLET | Refills: 3 | OUTPATIENT
Start: 2025-04-16

## 2025-08-08 ENCOUNTER — ANCILLARY PROCEDURE (OUTPATIENT)
Dept: MAMMOGRAPHY | Facility: CLINIC | Age: 65
End: 2025-08-08
Attending: FAMILY MEDICINE
Payer: COMMERCIAL

## 2025-08-08 DIAGNOSIS — Z12.31 VISIT FOR SCREENING MAMMOGRAM: ICD-10-CM

## 2025-08-08 PROCEDURE — 77063 BREAST TOMOSYNTHESIS BI: CPT | Mod: TC | Performed by: RADIOLOGY

## 2025-08-08 PROCEDURE — 77067 SCR MAMMO BI INCL CAD: CPT | Mod: TC | Performed by: RADIOLOGY
